# Patient Record
Sex: MALE | Race: WHITE | NOT HISPANIC OR LATINO | Employment: OTHER | URBAN - METROPOLITAN AREA
[De-identification: names, ages, dates, MRNs, and addresses within clinical notes are randomized per-mention and may not be internally consistent; named-entity substitution may affect disease eponyms.]

---

## 2023-06-15 DIAGNOSIS — I10 PRIMARY HYPERTENSION: Primary | ICD-10-CM

## 2023-06-15 RX ORDER — RAMIPRIL 10 MG/1
CAPSULE ORAL
Qty: 90 CAPSULE | Refills: 0 | Status: SHIPPED | OUTPATIENT
Start: 2023-06-15 | End: 2023-09-13

## 2023-07-13 DIAGNOSIS — E78.49 ESSENTIAL FAMILIAL HYPERLIPIDEMIA: ICD-10-CM

## 2023-07-14 RX ORDER — ATORVASTATIN CALCIUM 80 MG/1
TABLET, FILM COATED ORAL
Qty: 90 TABLET | Refills: 0 | Status: SHIPPED | OUTPATIENT
Start: 2023-07-14 | End: 2023-10-23

## 2023-09-13 DIAGNOSIS — I10 PRIMARY HYPERTENSION: ICD-10-CM

## 2023-09-13 RX ORDER — RAMIPRIL 10 MG/1
CAPSULE ORAL
Qty: 90 CAPSULE | Refills: 0 | Status: SHIPPED | OUTPATIENT
Start: 2023-09-13 | End: 2023-12-12

## 2023-10-23 DIAGNOSIS — E78.49 ESSENTIAL FAMILIAL HYPERLIPIDEMIA: ICD-10-CM

## 2023-10-23 RX ORDER — ATORVASTATIN CALCIUM 80 MG/1
TABLET, FILM COATED ORAL
Qty: 90 TABLET | Refills: 0 | Status: SHIPPED | OUTPATIENT
Start: 2023-10-23 | End: 2024-02-05

## 2023-12-05 ENCOUNTER — DOCUMENTATION (OUTPATIENT)
Dept: PRIMARY CARE | Facility: CLINIC | Age: 68
End: 2023-12-05
Payer: MEDICARE

## 2023-12-05 RX ORDER — SITAGLIPTIN AND METFORMIN HYDROCHLORIDE 1000; 50 MG/1; MG/1
1 TABLET, FILM COATED, EXTENDED RELEASE ORAL 2 TIMES DAILY
COMMUNITY
Start: 2022-04-20 | End: 2024-01-04 | Stop reason: SDUPTHER

## 2023-12-05 NOTE — PROGRESS NOTES
Patient brought in a form for his Janumet XR to be covered by the [employer] patient assistance program. We have filled this out in the past for him. Paperwork completed, signed by PCP and mailed to [facility] in the provided self addressed envelope. Copies made for patients chart and a copy has been mailed to patient.

## 2023-12-12 DIAGNOSIS — I10 PRIMARY HYPERTENSION: ICD-10-CM

## 2023-12-12 RX ORDER — RAMIPRIL 10 MG/1
CAPSULE ORAL
Qty: 90 CAPSULE | Refills: 0 | Status: SHIPPED | OUTPATIENT
Start: 2023-12-12 | End: 2024-03-05

## 2023-12-18 ASSESSMENT — ENCOUNTER SYMPTOMS
CHILLS: 0
ACTIVITY CHANGE: 0
COUGH: 0
CONFUSION: 0
APPETITE CHANGE: 0
EYE DISCHARGE: 0
ARTHRALGIAS: 0
FACIAL SWELLING: 0
WHEEZING: 0
PALPITATIONS: 0
COLOR CHANGE: 0
DIARRHEA: 0
FEVER: 0
CONSTIPATION: 0

## 2023-12-18 NOTE — PROGRESS NOTES
"Subjective   Reason for Visit: Kiko Forrest is an 68 y.o. male here for a Medicare Wellness visit.     Past Medical, Surgical, and Family History reviewed and updated in chart.    Reviewed all medications by prescribing practitioner or clinical pharmacist (such as prescriptions, OTCs, herbal therapies and supplements) and documented in the medical record.    HPI     Patient presents for physical exam. Patient is a known diabetic.     Fam Hx  Mom () living, dementia  Dad ()     Exercise walks 5x/week  ETOH every other week  Caffeine 30 oz of coffee/day  Tobacco 1/2 ppd   at [employer], retired     Colonoscopy 2019 Dr. Jung due 2024     Patient denies other complaints.   Patient Self Assessment of Health Status  Patient Self Assessment: Good    Nutrition and Exercise  Current Diet: Well Balanced Diet  Adequate Fluid Intake: Yes  Caffeine: Yes  Exercise Frequency: Infrequently    Functional Ability/Level of Safety  Cognitive Impairment Observed: No cognitive impairment observed  Cognitive Impairment Reported: No cognitive impairment reported by patient or family    Home Safety Risk Factors: None    Patient Care Team:  Jeaneth Siegel DO as PCP - General     Review of Systems   Constitutional:  Negative for activity change, appetite change, chills and fever.   HENT:  Negative for congestion, ear pain and facial swelling.    Eyes:  Negative for discharge.   Respiratory:  Negative for cough and wheezing.    Cardiovascular:  Negative for chest pain, palpitations and leg swelling.   Gastrointestinal:  Negative for constipation and diarrhea.   Musculoskeletal:  Negative for arthralgias.   Skin:  Negative for color change and pallor.   Neurological:  Negative for syncope.   Psychiatric/Behavioral:  Negative for confusion.        Objective   Vitals:  /72 (BP Location: Right arm)   Pulse 60   Ht 1.791 m (5' 10.5\")   Wt 98 kg (216 lb)   BMI 30.55 kg/m²       Physical Exam  Constitutional:       General: He " is not in acute distress.     Appearance: Normal appearance. He is not toxic-appearing.   HENT:      Head: Normocephalic.      Right Ear: Tympanic membrane, ear canal and external ear normal.      Left Ear: Tympanic membrane, ear canal and external ear normal.   Eyes:      Conjunctiva/sclera: Conjunctivae normal.      Pupils: Pupils are equal, round, and reactive to light.   Cardiovascular:      Rate and Rhythm: Normal rate and regular rhythm.      Pulses: Normal pulses.      Heart sounds: Normal heart sounds.   Pulmonary:      Effort: No respiratory distress.      Breath sounds: No wheezing, rhonchi or rales.   Abdominal:      General: Bowel sounds are normal. There is no distension.      Palpations: Abdomen is soft.      Tenderness: There is no abdominal tenderness.   Musculoskeletal:         General: No swelling or tenderness.   Skin:     Findings: No lesion or rash.   Neurological:      General: No focal deficit present.      Mental Status: He is alert and oriented to person, place, and time. Mental status is at baseline.      Gait: Gait normal.   Psychiatric:         Mood and Affect: Mood normal.         Behavior: Behavior normal.         Thought Content: Thought content normal.         Judgment: Judgment normal.         Assessment/Plan   Problem List Items Addressed This Visit       CAD (coronary artery disease)    Relevant Medications    metoprolol tartrate (Lopressor) 25 mg tablet    Mixed hyperlipidemia    Hypertension     Other Visit Diagnoses       Healthcare maintenance    -  Primary    Type 2 diabetes mellitus without complication, without long-term current use of insulin (CMS/[facility])              1. Patient's blood work unavailable at this office visit  2. Patient's LDL goal less than 70 secondary to diabetes, current LDL unavailable  3. Patient's triglycerides unavailable, goal triglycerides less than 150, continue on type IV diet  4. Patient's hgbA1c goal less than 6.5, current hemoglobin A1c  unavailable. Discussion about no added sugar diet and medication, recheck cmp and HgbA1c in 3 month  5. Colonoscopy 2019 Dr. Jung due 2024  6. Patient to call if questions or concerns

## 2023-12-20 ENCOUNTER — OFFICE VISIT (OUTPATIENT)
Dept: PRIMARY CARE | Facility: CLINIC | Age: 68
End: 2023-12-20
Payer: MEDICARE

## 2023-12-20 VITALS
DIASTOLIC BLOOD PRESSURE: 72 MMHG | WEIGHT: 216 LBS | HEIGHT: 71 IN | BODY MASS INDEX: 30.24 KG/M2 | HEART RATE: 60 BPM | SYSTOLIC BLOOD PRESSURE: 118 MMHG

## 2023-12-20 DIAGNOSIS — Z12.5 SCREENING FOR PROSTATE CANCER: ICD-10-CM

## 2023-12-20 DIAGNOSIS — E78.2 MIXED HYPERLIPIDEMIA: ICD-10-CM

## 2023-12-20 DIAGNOSIS — I10 PRIMARY HYPERTENSION: ICD-10-CM

## 2023-12-20 DIAGNOSIS — Z13.89 ENCOUNTER FOR SCREENING FOR OTHER DISORDER: ICD-10-CM

## 2023-12-20 DIAGNOSIS — Z00.00 ROUTINE GENERAL MEDICAL EXAMINATION AT HEALTH CARE FACILITY: ICD-10-CM

## 2023-12-20 DIAGNOSIS — Z71.89 ADVANCE DIRECTIVE DISCUSSED WITH PATIENT: ICD-10-CM

## 2023-12-20 DIAGNOSIS — Z00.00 HEALTHCARE MAINTENANCE: Primary | ICD-10-CM

## 2023-12-20 DIAGNOSIS — I25.10 CORONARY ARTERY DISEASE DUE TO LIPID RICH PLAQUE: ICD-10-CM

## 2023-12-20 DIAGNOSIS — E11.9 TYPE 2 DIABETES MELLITUS WITHOUT COMPLICATION, WITHOUT LONG-TERM CURRENT USE OF INSULIN (MULTI): ICD-10-CM

## 2023-12-20 DIAGNOSIS — I25.83 CORONARY ARTERY DISEASE DUE TO LIPID RICH PLAQUE: ICD-10-CM

## 2023-12-20 PROBLEM — E78.5 HYPERLIPIDEMIA: Status: ACTIVE | Noted: 2023-12-20

## 2023-12-20 PROBLEM — R80.9 PROTEIN IN URINE: Status: ACTIVE | Noted: 2023-12-20

## 2023-12-20 PROBLEM — D41.02: Status: ACTIVE | Noted: 2018-05-02

## 2023-12-20 PROBLEM — N28.89 RENAL MASS: Status: ACTIVE | Noted: 2018-06-18

## 2023-12-20 PROBLEM — D41.22: Status: ACTIVE | Noted: 2018-05-02

## 2023-12-20 PROCEDURE — 99497 ADVNCD CARE PLAN 30 MIN: CPT | Performed by: FAMILY MEDICINE

## 2023-12-20 PROCEDURE — 1158F ADVNC CARE PLAN TLK DOCD: CPT | Performed by: FAMILY MEDICINE

## 2023-12-20 PROCEDURE — G0444 DEPRESSION SCREEN ANNUAL: HCPCS | Performed by: FAMILY MEDICINE

## 2023-12-20 PROCEDURE — 1159F MED LIST DOCD IN RCRD: CPT | Performed by: FAMILY MEDICINE

## 2023-12-20 PROCEDURE — G0439 PPPS, SUBSEQ VISIT: HCPCS | Performed by: FAMILY MEDICINE

## 2023-12-20 PROCEDURE — 3074F SYST BP LT 130 MM HG: CPT | Performed by: FAMILY MEDICINE

## 2023-12-20 PROCEDURE — 4010F ACE/ARB THERAPY RXD/TAKEN: CPT | Performed by: FAMILY MEDICINE

## 2023-12-20 PROCEDURE — 99214 OFFICE O/P EST MOD 30 MIN: CPT | Performed by: FAMILY MEDICINE

## 2023-12-20 PROCEDURE — 3078F DIAST BP <80 MM HG: CPT | Performed by: FAMILY MEDICINE

## 2023-12-20 PROCEDURE — 1170F FXNL STATUS ASSESSED: CPT | Performed by: FAMILY MEDICINE

## 2023-12-20 PROCEDURE — 1160F RVW MEDS BY RX/DR IN RCRD: CPT | Performed by: FAMILY MEDICINE

## 2023-12-20 RX ORDER — METOPROLOL TARTRATE 25 MG/1
1 TABLET, FILM COATED ORAL 2 TIMES DAILY
COMMUNITY
Start: 2013-04-15 | End: 2024-02-19

## 2023-12-20 RX ORDER — ASPIRIN 81 MG/1
81 TABLET ORAL 2 TIMES DAILY
COMMUNITY

## 2023-12-20 RX ORDER — CHOLECALCIFEROL (VITAMIN D3) 25 MCG
TABLET,CHEWABLE ORAL
COMMUNITY

## 2023-12-20 ASSESSMENT — ACTIVITIES OF DAILY LIVING (ADL)
GROCERY_SHOPPING: INDEPENDENT
DRESSING: INDEPENDENT
TAKING_MEDICATION: INDEPENDENT
BATHING: INDEPENDENT
DOING_HOUSEWORK: INDEPENDENT
MANAGING_FINANCES: INDEPENDENT

## 2023-12-20 ASSESSMENT — PATIENT HEALTH QUESTIONNAIRE - PHQ9
1. LITTLE INTEREST OR PLEASURE IN DOING THINGS: NOT AT ALL
SUM OF ALL RESPONSES TO PHQ9 QUESTIONS 1 AND 2: 0
2. FEELING DOWN, DEPRESSED OR HOPELESS: NOT AT ALL

## 2023-12-20 ASSESSMENT — ENCOUNTER SYMPTOMS
LOSS OF SENSATION IN FEET: 0
OCCASIONAL FEELINGS OF UNSTEADINESS: 0
DEPRESSION: 0

## 2023-12-20 NOTE — ACP (ADVANCE CARE PLANNING)
Confirming Previous Code Status:   Advance Care Planning Note     Discussion Date: 12/20/23   Discussion Participants: patient    The patient wishes to discuss Advance Care Planning today and the following is a brief summary of our discussion.     Patient has capacity to make their own medical decisions: Yes  Health Care Agent/Surrogate Decision Maker documented in chart: Yes  wife  Documents on file and valid:  Advance Directive/Living Will: Yes   Health Care Power of : Yes  Other:     Communication of Medical Status/Prognosis:   stable    Communication of Treatment Goals/Options:   stable    Treatment Decisions  Goals of Care: quality of life is prioritized; willing to accept low-burden treatments to achieve meaningful goals     Follow Up Plan  stable  Team Members  stable  Time Statement: Total face to face time spent on advance care planning was 5 minutes with 16 minutes spent in counseling, including the explanation.    Jeaneth Siegel DO  12/20/2023 8:32 AM

## 2024-01-04 ENCOUNTER — TELEPHONE (OUTPATIENT)
Dept: PRIMARY CARE | Facility: CLINIC | Age: 69
End: 2024-01-04
Payer: MEDICARE

## 2024-01-04 DIAGNOSIS — E11.9 TYPE 2 DIABETES MELLITUS WITHOUT COMPLICATION, WITHOUT LONG-TERM CURRENT USE OF INSULIN (MULTI): Primary | ICD-10-CM

## 2024-01-04 RX ORDER — SITAGLIPTIN AND METFORMIN HYDROCHLORIDE 1000; 50 MG/1; MG/1
1 TABLET, FILM COATED, EXTENDED RELEASE ORAL 2 TIMES DAILY
Qty: 56 TABLET | Refills: 0 | COMMUNITY
Start: 2024-01-04 | End: 2024-02-21 | Stop reason: SDUPTHER

## 2024-01-04 NOTE — PROGRESS NOTES
Call received from patient regarding his Jan[facility]et XR [facility]. He called [facility] and they state they did not receive the form for re-enrollment. I have a copy in his chart and my documentation shows this was mailed to them on 12/5/23. I called [facility] to see if I could fax it them, but they do not allow that. I do need to mail them a copy of the form. Copy of form printed and mailed to the address on the paperwork (I confirmed with [facility] that this is the right address). Patient however is going to be out of his medication in about one week. I found samples that he could have that are good until the end of the month. He will swing by to pick them up in the next day or so.

## 2024-02-03 DIAGNOSIS — E78.49 ESSENTIAL FAMILIAL HYPERLIPIDEMIA: ICD-10-CM

## 2024-02-05 RX ORDER — ATORVASTATIN CALCIUM 80 MG/1
TABLET, FILM COATED ORAL
Qty: 90 TABLET | Refills: 0 | Status: SHIPPED | OUTPATIENT
Start: 2024-02-05 | End: 2024-05-02 | Stop reason: SDUPTHER

## 2024-02-14 ENCOUNTER — PATIENT OUTREACH (OUTPATIENT)
Dept: PRIMARY CARE | Facility: CLINIC | Age: 69
End: 2024-02-14
Payer: MEDICARE

## 2024-02-14 DIAGNOSIS — E11.9 TYPE 2 DIABETES MELLITUS WITHOUT COMPLICATION, WITHOUT LONG-TERM CURRENT USE OF INSULIN (MULTI): ICD-10-CM

## 2024-02-14 NOTE — PROGRESS NOTES
Patient called me to let me know that he is running low on Janumet XR. He has been getting the run around from [employer] about them receiving the application for assistance. They did tell patient they received 2 applications. They said they mailed out the attestation form to him on 2/6 yet he states he received it on 2/6. He has completed this and sent this back. I did check the office and we do not have samples of the medication here for me to offer. He states that he feels like he will be ok without it. I did contact [employer] to inquire about samples. Everything has to be completed online now, so I created an account and requested the necessary samples. They say it takes 3-5 days to be sent to our office. Once received, I will call Keven and let him know he can pick some up. He also agreed to let me know in the meantime if he receives his medication from [employer].

## 2024-02-18 DIAGNOSIS — I10 PRIMARY HYPERTENSION: Primary | ICD-10-CM

## 2024-02-19 RX ORDER — METOPROLOL TARTRATE 25 MG/1
25 TABLET, FILM COATED ORAL 2 TIMES DAILY
Qty: 180 TABLET | Refills: 0 | Status: SHIPPED | OUTPATIENT
Start: 2024-02-19 | End: 2024-05-21

## 2024-02-21 ENCOUNTER — PATIENT OUTREACH (OUTPATIENT)
Dept: PRIMARY CARE | Facility: CLINIC | Age: 69
End: 2024-02-21
Payer: MEDICARE

## 2024-02-21 DIAGNOSIS — E11.9 TYPE 2 DIABETES MELLITUS WITHOUT COMPLICATION, WITHOUT LONG-TERM CURRENT USE OF INSULIN (MULTI): ICD-10-CM

## 2024-02-21 RX ORDER — SITAGLIPTIN AND METFORMIN HYDROCHLORIDE 1000; 50 MG/1; MG/1
1 TABLET, FILM COATED, EXTENDED RELEASE ORAL 2 TIMES DAILY
Qty: 56 TABLET | Refills: 0 | COMMUNITY
Start: 2024-02-21

## 2024-02-21 NOTE — PROGRESS NOTES
Patient notified that samples have been received in the office. He can pick them up at any time. He states he did talk to [employer] yesterday. He keeps getting the run around from them. They sent out another attestation form to go with the second application they got. But they state they never got the first one back either. So he is out of medication. #56 tabs available for  of Janumet XR  mg. Lot # [id number] Exp 4/25.

## 2024-03-04 ENCOUNTER — TELEPHONE (OUTPATIENT)
Dept: PRIMARY CARE | Facility: CLINIC | Age: 69
End: 2024-03-04
Payer: MEDICARE

## 2024-03-04 NOTE — TELEPHONE ENCOUNTER
Please schedule patient for [facility] wellness visit in Dec 2024. Schedule with Dr. Siegel Please send this encounter to Dr. Siegel for lab orders once scheduled.     Thank you-  Luke Wild CMA  3/4/2024  Practice Supervisor  [facility]

## 2024-03-05 DIAGNOSIS — I10 PRIMARY HYPERTENSION: ICD-10-CM

## 2024-03-05 RX ORDER — RAMIPRIL 10 MG/1
CAPSULE ORAL
Qty: 90 CAPSULE | Refills: 0 | Status: SHIPPED | OUTPATIENT
Start: 2024-03-05 | End: 2024-05-30 | Stop reason: SDUPTHER

## 2024-03-11 NOTE — TELEPHONE ENCOUNTER
Pt coming in on 12/03/24 for medicare annual physical. BW? Pt would like blood work orders mailed to:  [address]  [facility] [id number]

## 2024-03-12 DIAGNOSIS — I25.10 CORONARY ARTERY DISEASE DUE TO LIPID RICH PLAQUE: ICD-10-CM

## 2024-03-12 DIAGNOSIS — Z12.5 SCREENING FOR PROSTATE CANCER: ICD-10-CM

## 2024-03-12 DIAGNOSIS — I25.83 CORONARY ARTERY DISEASE DUE TO LIPID RICH PLAQUE: ICD-10-CM

## 2024-03-12 DIAGNOSIS — I10 PRIMARY HYPERTENSION: ICD-10-CM

## 2024-03-12 DIAGNOSIS — Z00.00 ENCOUNTER FOR ANNUAL GENERAL MEDICAL EXAMINATION WITHOUT ABNORMAL FINDINGS IN ADULT: ICD-10-CM

## 2024-03-12 DIAGNOSIS — E11.9 TYPE 2 DIABETES MELLITUS WITHOUT COMPLICATION, WITHOUT LONG-TERM CURRENT USE OF INSULIN (MULTI): ICD-10-CM

## 2024-03-12 DIAGNOSIS — E78.2 MIXED HYPERLIPIDEMIA: ICD-10-CM

## 2024-05-02 DIAGNOSIS — E78.49 ESSENTIAL FAMILIAL HYPERLIPIDEMIA: ICD-10-CM

## 2024-05-02 RX ORDER — ATORVASTATIN CALCIUM 80 MG/1
TABLET, FILM COATED ORAL
Qty: 90 TABLET | Refills: 0 | Status: SHIPPED | OUTPATIENT
Start: 2024-05-02

## 2024-05-21 DIAGNOSIS — I10 PRIMARY HYPERTENSION: ICD-10-CM

## 2024-05-21 RX ORDER — METOPROLOL TARTRATE 25 MG/1
25 TABLET, FILM COATED ORAL 2 TIMES DAILY
Qty: 180 TABLET | Refills: 0 | Status: SHIPPED | OUTPATIENT
Start: 2024-05-21

## 2024-05-30 DIAGNOSIS — I10 PRIMARY HYPERTENSION: ICD-10-CM

## 2024-05-30 RX ORDER — RAMIPRIL 10 MG/1
CAPSULE ORAL
Qty: 90 CAPSULE | Refills: 0 | Status: SHIPPED | OUTPATIENT
Start: 2024-05-30

## 2024-07-05 DIAGNOSIS — I10 PRIMARY HYPERTENSION: ICD-10-CM

## 2024-07-05 RX ORDER — METOPROLOL TARTRATE 25 MG/1
25 TABLET, FILM COATED ORAL 2 TIMES DAILY
Qty: 180 TABLET | Refills: 0 | Status: SHIPPED | OUTPATIENT
Start: 2024-07-05

## 2024-07-29 DIAGNOSIS — E78.49 ESSENTIAL FAMILIAL HYPERLIPIDEMIA: ICD-10-CM

## 2024-07-29 DIAGNOSIS — I10 PRIMARY HYPERTENSION: ICD-10-CM

## 2024-07-29 RX ORDER — ATORVASTATIN CALCIUM 80 MG/1
TABLET, FILM COATED ORAL
Qty: 90 TABLET | Refills: 0 | Status: SHIPPED | OUTPATIENT
Start: 2024-07-29

## 2024-07-29 RX ORDER — RAMIPRIL 10 MG/1
CAPSULE ORAL
Qty: 90 CAPSULE | Refills: 0 | Status: SHIPPED | OUTPATIENT
Start: 2024-07-29

## 2024-07-29 RX ORDER — METOPROLOL TARTRATE 25 MG/1
25 TABLET, FILM COATED ORAL 2 TIMES DAILY
Qty: 180 TABLET | Refills: 0 | Status: SHIPPED | OUTPATIENT
Start: 2024-07-29

## 2024-09-14 ENCOUNTER — OFFICE VISIT (OUTPATIENT)
Dept: PRIMARY CARE | Facility: CLINIC | Age: 69
End: 2024-09-14
Payer: COMMERCIAL

## 2024-09-14 VITALS
TEMPERATURE: 97.8 F | SYSTOLIC BLOOD PRESSURE: 122 MMHG | WEIGHT: 209 LBS | BODY MASS INDEX: 29.56 KG/M2 | DIASTOLIC BLOOD PRESSURE: 78 MMHG | HEART RATE: 61 BPM

## 2024-09-14 DIAGNOSIS — Z00.00 HEALTHCARE MAINTENANCE: ICD-10-CM

## 2024-09-14 DIAGNOSIS — E11.9 TYPE 2 DIABETES MELLITUS WITHOUT COMPLICATION, WITHOUT LONG-TERM CURRENT USE OF INSULIN (MULTI): ICD-10-CM

## 2024-09-14 DIAGNOSIS — L89.893: Primary | ICD-10-CM

## 2024-09-14 LAB — POC HEMOGLOBIN A1C: 6.6 % (ref 4.2–6.5)

## 2024-09-14 PROCEDURE — 3078F DIAST BP <80 MM HG: CPT | Performed by: FAMILY MEDICINE

## 2024-09-14 PROCEDURE — 1159F MED LIST DOCD IN RCRD: CPT | Performed by: FAMILY MEDICINE

## 2024-09-14 PROCEDURE — 99214 OFFICE O/P EST MOD 30 MIN: CPT | Performed by: FAMILY MEDICINE

## 2024-09-14 PROCEDURE — 83036 HEMOGLOBIN GLYCOSYLATED A1C: CPT | Performed by: FAMILY MEDICINE

## 2024-09-14 PROCEDURE — 3074F SYST BP LT 130 MM HG: CPT | Performed by: FAMILY MEDICINE

## 2024-09-14 PROCEDURE — 1160F RVW MEDS BY RX/DR IN RCRD: CPT | Performed by: FAMILY MEDICINE

## 2024-09-14 PROCEDURE — 4010F ACE/ARB THERAPY RXD/TAKEN: CPT | Performed by: FAMILY MEDICINE

## 2024-09-14 RX ORDER — ACYCLOVIR 200 MG/1
200 CAPSULE ORAL
Qty: 50 CAPSULE | Refills: 0 | Status: SHIPPED | OUTPATIENT
Start: 2024-09-14 | End: 2024-09-24

## 2024-09-14 RX ORDER — DOXYCYCLINE 100 MG/1
100 CAPSULE ORAL 2 TIMES DAILY
Qty: 20 CAPSULE | Refills: 0 | Status: SHIPPED | OUTPATIENT
Start: 2024-09-14 | End: 2024-09-24

## 2024-09-14 ASSESSMENT — ENCOUNTER SYMPTOMS
FEVER: 0
CONFUSION: 0
WHEEZING: 0
APPETITE CHANGE: 0
FACIAL SWELLING: 0
COLOR CHANGE: 0
EYE DISCHARGE: 0
PALPITATIONS: 0
WOUND: 1
ARTHRALGIAS: 0
DIARRHEA: 0
CONSTIPATION: 0
CHILLS: 0
ACTIVITY CHANGE: 0
COUGH: 0

## 2024-09-14 NOTE — PROGRESS NOTES
"Subjective   Patient ID: Kiko Forrest \"Robson" is a 69 y.o. male who presents for Possible infected wound right lower leg. .    HPI   Patient has had a wound on his anterior shin for about 4-6 weeks. A few weeks ago he then bumped into the back of something. He now has two large ulcerations on his right lower leg.     Review of Systems   Constitutional:  Negative for activity change, appetite change, chills and fever.   HENT:  Negative for congestion, ear pain and facial swelling.    Eyes:  Negative for discharge.   Respiratory:  Negative for cough and wheezing.    Cardiovascular:  Negative for chest pain, palpitations and leg swelling.   Gastrointestinal:  Negative for constipation and diarrhea.   Musculoskeletal:  Negative for arthralgias.   Skin:  Positive for wound. Negative for color change and pallor.   Neurological:  Negative for syncope.   Psychiatric/Behavioral:  Negative for confusion.        Objective   /78 (BP Location: Left arm, Patient Position: Sitting)   Pulse 61   Temp 36.6 °C (97.8 °F)   Wt 94.8 kg (209 lb)   BMI 29.56 kg/m²   BSA Body surface area is 2.17 meters squared.      Physical Exam  Constitutional:       General: He is not in acute distress.     Appearance: Normal appearance. He is not toxic-appearing.   HENT:      Head: Normocephalic.      Right Ear: Tympanic membrane, ear canal and external ear normal.      Left Ear: Tympanic membrane, ear canal and external ear normal.   Eyes:      Conjunctiva/sclera: Conjunctivae normal.      Pupils: Pupils are equal, round, and reactive to light.   Cardiovascular:      Rate and Rhythm: Normal rate and regular rhythm.      Pulses: Normal pulses.      Heart sounds: Normal heart sounds.   Pulmonary:      Effort: No respiratory distress.      Breath sounds: No wheezing, rhonchi or rales.   Musculoskeletal:         General: No swelling or tenderness.   Skin:     Findings: Lesion present. No rash.      Comments: Anterior right shin 2 cm x 2 cm " ulceration  Posterior right shin 2.5 cm x 2.5 cm ulceration   Neurological:      General: No focal deficit present.      Mental Status: He is alert and oriented to person, place, and time. Mental status is at baseline.      Gait: Gait normal.   Psychiatric:         Mood and Affect: Mood normal.         Behavior: Behavior normal.         Thought Content: Thought content normal.         Judgment: Judgment normal.     No visits with results within 1 Year(s) from this visit.   Latest known visit with results is:   Legacy Encounter on 04/21/2022   Component Date Value Ref Range Status   • Prostate Specific Antigen,Screen 04/21/2022 2.83  0.00 - 4.00 ng/mL Final    Comment: The FDA requires that the method used for PSA assay be   reported to the physician. Values obtained with different   assay methods must not be used interchangeably. This test   was performed at [facility] using the Siemens  to-BBB PSA method, which is a sandwich immunoassay using   chemiluminescence for quantitation. The assay is approved  for measurement of prostate-specific antigen (PSA) in   serum and may be used in conjunction with a digital rectal  examination in men 50 years and older as an aid in   detection of prostate cancer.   5-Alpha-reductase inhibitors (e.g. Proscar, Finasteride,   Avodart, Dutasteride and Kay) for the treatment of BPH   have been shown to lower PSA levels by an average of 50%   after 6 months of treatment.     • Hemoglobin A1C 04/21/2022 7.2 (A)  % Final    Comment:      Diagnosis of Diabetes-Adults   Non-Diabetic: < or = 5.6%   Increased risk for developing diabetes: 5.7-6.4%   Diagnostic of diabetes: > or = 6.5%  .       Monitoring of Diabetes                Age (y)     Therapeutic Goal (%)   Adults:          >18           <7.0   Pediatrics:    13-18           <7.5                   7-12           <8.0                   0- 6            7.5-8.5   American Diabetes Association. Diabetes Care 33(S1),  Jan 2010.     • Estimated Average Glucose 04/21/2022 160  MG/DL Final   • TSH 04/21/2022 2.01  0.44 - 3.98 mIU/L Final    Comment:  TSH testing is performed using different testing    methodology at [facility] than at other    [facility] hospitals. Direct result comparisons should    only be made within the same method.     • Magnesium 04/21/2022 1.83  1.60 - 2.40 mg/dL Final   • Vitamin B-12 04/21/2022 339  211 - 911 pg/mL Final   • Glucose 04/21/2022 147 (H)  74 - 99 mg/dL Final   • Sodium 04/21/2022 135 (L)  136 - 145 mmol/L Final   • Potassium 04/21/2022 4.8  3.5 - 5.3 mmol/L Final   • Chloride 04/21/2022 101  98 - 107 mmol/L Final   • Bicarbonate 04/21/2022 26  21 - 32 mmol/L Final   • Anion Gap 04/21/2022 13  10 - 20 mmol/L Final   • Urea Nitrogen 04/21/2022 24 (H)  6 - 23 mg/dL Final   • Creatinine 04/21/2022 1.43 (H)  0.50 - 1.30 mg/dL Final   • GFR MALE 04/21/2022 54 (A)  >90 mL/min/1.73m2 Final    Comment:  CALCULATIONS OF ESTIMATED GFR ARE PERFORMED   USING THE 2021 CKD-EPI STUDY REFIT EQUATION   WITHOUT THE RACE VARIABLE FOR THE IDMS-TRACEABLE   CREATININE METHODS.    https://jasn.asnjournals.org/content/early/2021/09/22/ASN.9250114470     • Calcium 04/21/2022 10.2  8.6 - 10.6 mg/dL Final   • Albumin 04/21/2022 4.6  3.4 - 5.0 g/dL Final   • Alkaline Phosphatase 04/21/2022 124  33 - 136 U/L Final   • Total Protein 04/21/2022 6.9  6.4 - 8.2 g/dL Final   • AST 04/21/2022 17  9 - 39 U/L Final   • Total Bilirubin 04/21/2022 1.0  0.0 - 1.2 mg/dL Final   • ALT (SGPT) 04/21/2022 23  10 - 52 U/L Final    Comment:  Patients treated with Sulfasalazine may generate    falsely decreased results for ALT.     • WBC 04/21/2022 10.7  4.4 - 11.3 x10E9/L Final   • nRBC 04/21/2022 0.0  0.0 - 0.0 /100 WBC Final   • RBC 04/21/2022 5.62  4.50 - 5.90 x10E12/L Final   • Hemoglobin 04/21/2022 18.3 (H)  13.5 - 17.5 g/dL Final   • Hematocrit 04/21/2022 54.4 (H)  41.0 - 52.0 % Final   • MCV 04/21/2022 97  80 - 100 fL Final    • MCHC 04/21/2022 33.6  32.0 - 36.0 g/dL Final   • Platelets 04/21/2022 187  150 - 450 x10E9/L Final   • RDW 04/21/2022 12.4  11.5 - 14.5 % Final   • Neutrophils % 04/21/2022 69.6  40.0 - 80.0 % Final   • Immature Granulocytes %, Automated 04/21/2022 0.5  0.0 - 0.9 % Final    Comment:  Immature Granulocyte Count (IG) includes promyelocytes,    myelocytes and metamyelocytes but does not include bands.   Percent differential counts (%) should be interpreted in the   context of the absolute cell counts (cells/L).     • Lymphocytes % 04/21/2022 18.7  13.0 - 44.0 % Final   • Monocytes % 04/21/2022 7.3  2.0 - 10.0 % Final   • Eosinophils % 04/21/2022 3.0  0.0 - 6.0 % Final   • Basophils % 04/21/2022 0.9  0.0 - 2.0 % Final   • Neutrophils Absolute 04/21/2022 7.46  1.20 - 7.70 x10E9/L Final   • Lymphocytes Absolute 04/21/2022 2.00  1.20 - 4.80 x10E9/L Final   • Monocytes Absolute 04/21/2022 0.78  0.10 - 1.00 x10E9/L Final   • Eosinophils Absolute 04/21/2022 0.32  0.00 - 0.70 x10E9/L Final   • Basophils Absolute 04/21/2022 0.10  0.00 - 0.10 x10E9/L Final   • Cholesterol 04/21/2022 111  0 - 199 mg/dL Final    Comment: .      AGE      DESIRABLE   BORDERLINE HIGH   HIGH     0-19 Y     0 - 169       170 - 199     >/= 200    20-24 Y     0 - 189       190 - 224     >/= 225         >24 Y     0 - 199       200 - 239     >/= 240   **All ranges are based on fasting samples. Specific   therapeutic targets will vary based on patient-specific   cardiac risk.  .   Pediatric guidelines reference:Pediatrics 2011, 128(S5).   Adult guidelines reference: NCEP ATPIII Guidelines,     ML 2001, 258:2486-97  .   Venipuncture immediately after or during the    administration of Metamizole may lead to falsely   low results. Testing should be performed immediately   prior to Metamizole dosing.     • HDL 04/21/2022 25.8 (A)  mg/dL Final    Comment: .      AGE      VERY LOW   LOW     NORMAL    HIGH       0-19 Y       < 35   < 40     40-45     ----     20-24 Y       ----   < 40       >45     ----      >24 Y       ----   < 40     40-60      >60  .     • Cholesterol/HDL Ratio 04/21/2022 4.3   Final    Comment: REF VALUES  DESIRABLE  < 3.4  HIGH RISK  > 5.0     • LDL 04/21/2022 49  0 - 99 mg/dL Final    Comment: .                           NEAR      BORD      AGE      DESIRABLE  OPTIMAL    HIGH     HIGH     VERY HIGH     0-19 Y     0 - 109     ---    110-129   >/= 130     ----    20-24 Y     0 - 119     ---    120-159   >/= 160     ----      >24 Y     0 -  99   100-129  130-159   160-189     >/=190  .     • VLDL 04/21/2022 36  0 - 40 mg/dL Final   • Triglycerides 04/21/2022 180 (H)  0 - 149 mg/dL Final    Comment: .      AGE      DESIRABLE   BORDERLINE HIGH   HIGH     VERY HIGH   0 D-90 D    19 - 174         ----         ----        ----  91 D- 9 Y     0 -  74        75 -  99     >/= 100      ----    10-19 Y     0 -  89        90 - 129     >/= 130      ----    20-24 Y     0 - 114       115 - 149     >/= 150      ----         >24 Y     0 - 149       150 - 199    200- 499    >/= 500  .   Venipuncture immediately after or during the    administration of Metamizole may lead to falsely   low results. Testing should be performed immediately   prior to Metamizole dosing.       Current Outpatient Medications on File Prior to Visit   Medication Sig Dispense Refill   • aspirin 81 mg EC tablet Take 1 tablet (81 mg) by mouth 2 times a day.     • atorvastatin (Lipitor) 80 mg tablet TAKE 1 TABLET BY MOUTH ONCE DAILY 90 tablet 0   • cyanocobalamin, vitamin B-12, 3,000 mcg lozenge Place under the tongue once daily.     • metoprolol tartrate (Lopressor) 25 mg tablet TAKE 1 TABLET BY MOUTH TWO TIMES A  tablet 0   • ramipril (Altace) 10 mg capsule TAKE 1 CAPSULE BY MOUTH ONCE DAILY 90 capsule 0   • SITagliptin phos-metformin (Janumet XR) 50-1,000 mg tablet, ER multiphase 24 hr Take 1 tablet by mouth 2 times a day. 56 tablet 0     No current facility-administered medications on  file prior to visit.     No images are attached to the encounter.            Assessment/Plan   Diagnoses and all orders for this visit:  Pressure injury of lower extremity, stage 3 (Multi)  -     doxycycline (Vibramycin) 100 mg capsule; Take 1 capsule (100 mg) by mouth 2 times a day for 10 days. Take with at least 8 ounces (large glass) of water, do not lie down for 30 minutes after  -     Vascular US ankle brachial index (CARMEN) without exercise; Future  -     NM bone whole body; Future  Patient to have ankle-brachial index and bone scan  Patient to be seen by wound center  Patient will follow-up with us after his vacation  Patient to call if worsening symptoms

## 2024-10-02 ENCOUNTER — HOSPITAL ENCOUNTER (OUTPATIENT)
Dept: VASCULAR MEDICINE | Facility: CLINIC | Age: 69
Discharge: HOME | End: 2024-10-02
Payer: MEDICARE

## 2024-10-02 ENCOUNTER — HOSPITAL ENCOUNTER (OUTPATIENT)
Dept: RADIOLOGY | Facility: CLINIC | Age: 69
Discharge: HOME | End: 2024-10-02
Payer: MEDICARE

## 2024-10-02 DIAGNOSIS — E13.621 OTHER SPECIFIED DIABETES MELLITUS WITH FOOT ULCER (CODE): ICD-10-CM

## 2024-10-02 DIAGNOSIS — L89.893: ICD-10-CM

## 2024-10-02 PROCEDURE — 78315 BONE IMAGING 3 PHASE: CPT

## 2024-10-02 PROCEDURE — 93922 UPR/L XTREMITY ART 2 LEVELS: CPT | Performed by: INTERNAL MEDICINE

## 2024-10-02 PROCEDURE — 3430000001 HC RX 343 DIAGNOSTIC RADIOPHARMACEUTICALS: Performed by: FAMILY MEDICINE

## 2024-10-02 PROCEDURE — A9503 TC99M MEDRONATE: HCPCS | Performed by: FAMILY MEDICINE

## 2024-10-02 PROCEDURE — 78315 BONE IMAGING 3 PHASE: CPT | Performed by: RADIOLOGY

## 2024-10-02 PROCEDURE — 93922 UPR/L XTREMITY ART 2 LEVELS: CPT

## 2024-11-04 ENCOUNTER — APPOINTMENT (OUTPATIENT)
Dept: PRIMARY CARE | Facility: CLINIC | Age: 69
End: 2024-11-04
Payer: MEDICARE

## 2024-11-04 VITALS — WEIGHT: 200.8 LBS | DIASTOLIC BLOOD PRESSURE: 62 MMHG | BODY MASS INDEX: 28.4 KG/M2 | SYSTOLIC BLOOD PRESSURE: 120 MMHG

## 2024-11-04 DIAGNOSIS — E11.9 TYPE 2 DIABETES MELLITUS WITHOUT COMPLICATION, WITHOUT LONG-TERM CURRENT USE OF INSULIN (MULTI): Primary | ICD-10-CM

## 2024-11-04 DIAGNOSIS — I10 PRIMARY HYPERTENSION: ICD-10-CM

## 2024-11-04 DIAGNOSIS — E78.2 MIXED HYPERLIPIDEMIA: ICD-10-CM

## 2024-11-04 DIAGNOSIS — L89.893: ICD-10-CM

## 2024-11-04 PROCEDURE — 3074F SYST BP LT 130 MM HG: CPT | Performed by: FAMILY MEDICINE

## 2024-11-04 PROCEDURE — G2211 COMPLEX E/M VISIT ADD ON: HCPCS | Performed by: FAMILY MEDICINE

## 2024-11-04 PROCEDURE — 99214 OFFICE O/P EST MOD 30 MIN: CPT | Performed by: FAMILY MEDICINE

## 2024-11-04 PROCEDURE — 1160F RVW MEDS BY RX/DR IN RCRD: CPT | Performed by: FAMILY MEDICINE

## 2024-11-04 PROCEDURE — 1159F MED LIST DOCD IN RCRD: CPT | Performed by: FAMILY MEDICINE

## 2024-11-04 PROCEDURE — 4010F ACE/ARB THERAPY RXD/TAKEN: CPT | Performed by: FAMILY MEDICINE

## 2024-11-04 PROCEDURE — 3078F DIAST BP <80 MM HG: CPT | Performed by: FAMILY MEDICINE

## 2024-11-04 ASSESSMENT — ENCOUNTER SYMPTOMS
FACIAL SWELLING: 0
CONFUSION: 0
PALPITATIONS: 0
WHEEZING: 0
COUGH: 0
APPETITE CHANGE: 0
WOUND: 1
COLOR CHANGE: 0
CHILLS: 0
EYE DISCHARGE: 0
DIARRHEA: 0
ARTHRALGIAS: 0
ACTIVITY CHANGE: 0
FEVER: 0
CONSTIPATION: 0

## 2024-11-04 NOTE — PROGRESS NOTES
"Subjective   Patient ID: Kiko Forrest \"Robson" is a 69 y.o. male who presents for No chief complaint on file..    HPI   Patient has had a wound on his anterior shin for about 3 months. 3 months ago he bumped into the back of something. He now has two large ulcerations on his right lower leg.  He did have ankle-brachial index which was negative.  Also patient's bone scan was negative for osteomyelitis.    Review of Systems   Constitutional:  Negative for activity change, appetite change, chills and fever.   HENT:  Negative for congestion, ear pain and facial swelling.    Eyes:  Negative for discharge.   Respiratory:  Negative for cough and wheezing.    Cardiovascular:  Negative for chest pain, palpitations and leg swelling.   Gastrointestinal:  Negative for constipation and diarrhea.   Musculoskeletal:  Negative for arthralgias.   Skin:  Positive for wound. Negative for color change and pallor.   Neurological:  Negative for syncope.   Psychiatric/Behavioral:  Negative for confusion.        Objective   There were no vitals taken for this visit.  BSA There is no height or weight on file to calculate BSA.      Physical Exam  Constitutional:       General: He is not in acute distress.     Appearance: Normal appearance. He is not toxic-appearing.   HENT:      Head: Normocephalic.      Right Ear: Tympanic membrane, ear canal and external ear normal.      Left Ear: Tympanic membrane, ear canal and external ear normal.   Eyes:      Conjunctiva/sclera: Conjunctivae normal.      Pupils: Pupils are equal, round, and reactive to light.   Cardiovascular:      Rate and Rhythm: Normal rate and regular rhythm.      Pulses: Normal pulses.      Heart sounds: Normal heart sounds.   Pulmonary:      Effort: No respiratory distress.      Breath sounds: No wheezing, rhonchi or rales.   Musculoskeletal:         General: No swelling or tenderness.   Skin:     Findings: Lesion present. No rash.      Comments: Anterior right shin 2 cm x 2 cm " ulceration  Posterior right shin 2.5 cm x 2.5 cm ulceration   Neurological:      General: No focal deficit present.      Mental Status: He is alert and oriented to person, place, and time. Mental status is at baseline.      Gait: Gait normal.   Psychiatric:         Mood and Affect: Mood normal.         Behavior: Behavior normal.         Thought Content: Thought content normal.         Judgment: Judgment normal.       Office Visit on 09/14/2024   Component Date Value Ref Range Status    POC HEMOGLOBIN A1c 09/14/2024 6.6 (A)  4.2 - 6.5 % Final     Current Outpatient Medications on File Prior to Visit   Medication Sig Dispense Refill    aspirin 81 mg EC tablet Take 1 tablet (81 mg) by mouth 2 times a day.      atorvastatin (Lipitor) 80 mg tablet TAKE 1 TABLET BY MOUTH ONCE DAILY 90 tablet 0    cyanocobalamin, vitamin B-12, 3,000 mcg lozenge Place under the tongue once daily.      metoprolol tartrate (Lopressor) 25 mg tablet TAKE 1 TABLET BY MOUTH TWO TIMES A  tablet 0    ramipril (Altace) 10 mg capsule TAKE 1 CAPSULE BY MOUTH ONCE DAILY 90 capsule 0    SITagliptin phos-metformin (Janumet XR) 50-1,000 mg tablet, ER multiphase 24 hr Take 1 tablet by mouth 2 times a day. 56 tablet 0     No current facility-administered medications on file prior to visit.     No images are attached to the encounter.            Assessment/Plan   There are no diagnoses linked to this encounter.    Patient to continue to keep keep his blood pressures under good control  We will follow-up with us regarding leg if wounds worsen  Patient to call if worsening symptoms

## 2024-11-10 DIAGNOSIS — E78.49 ESSENTIAL FAMILIAL HYPERLIPIDEMIA: ICD-10-CM

## 2024-11-11 RX ORDER — ATORVASTATIN CALCIUM 80 MG/1
TABLET, FILM COATED ORAL
Qty: 90 TABLET | Refills: 0 | Status: SHIPPED | OUTPATIENT
Start: 2024-11-11

## 2024-11-13 DIAGNOSIS — I10 PRIMARY HYPERTENSION: ICD-10-CM

## 2024-11-13 RX ORDER — RAMIPRIL 10 MG/1
CAPSULE ORAL
Qty: 90 CAPSULE | Refills: 0 | Status: SHIPPED | OUTPATIENT
Start: 2024-11-13

## 2024-11-18 DIAGNOSIS — E11.9 TYPE 2 DIABETES MELLITUS WITHOUT COMPLICATION, WITHOUT LONG-TERM CURRENT USE OF INSULIN (MULTI): ICD-10-CM

## 2024-11-18 RX ORDER — SITAGLIPTIN AND METFORMIN HYDROCHLORIDE 1000; 50 MG/1; MG/1
1 TABLET, FILM COATED, EXTENDED RELEASE ORAL 2 TIMES DAILY
Qty: 180 TABLET | Refills: 2 | Status: SHIPPED | OUTPATIENT
Start: 2024-11-18

## 2024-12-03 ENCOUNTER — APPOINTMENT (OUTPATIENT)
Dept: PRIMARY CARE | Facility: CLINIC | Age: 69
End: 2024-12-03
Payer: MEDICARE

## 2024-12-23 ENCOUNTER — PATIENT OUTREACH (OUTPATIENT)
Dept: PRIMARY CARE | Facility: CLINIC | Age: 69
End: 2024-12-23
Payer: MEDICARE

## 2024-12-23 DIAGNOSIS — L89.893: ICD-10-CM

## 2024-12-23 DIAGNOSIS — I25.10 CORONARY ARTERY DISEASE DUE TO LIPID RICH PLAQUE: ICD-10-CM

## 2024-12-23 DIAGNOSIS — E11.9 TYPE 2 DIABETES MELLITUS WITHOUT COMPLICATION, WITHOUT LONG-TERM CURRENT USE OF INSULIN (MULTI): ICD-10-CM

## 2024-12-23 DIAGNOSIS — I10 PRIMARY HYPERTENSION: ICD-10-CM

## 2024-12-23 DIAGNOSIS — I25.83 CORONARY ARTERY DISEASE DUE TO LIPID RICH PLAQUE: ICD-10-CM

## 2024-12-23 RX ORDER — CEFDINIR 300 MG/1
300 CAPSULE ORAL 2 TIMES DAILY
COMMUNITY
Start: 2024-12-21 | End: 2024-12-31

## 2024-12-23 RX ORDER — SULFAMETHOXAZOLE AND TRIMETHOPRIM 800; 160 MG/1; MG/1
1 TABLET ORAL 2 TIMES DAILY
COMMUNITY
Start: 2024-12-22 | End: 2025-01-01

## 2024-12-23 NOTE — PROGRESS NOTES
Discharge facility: [facility]  Discharge diagnosis: Diabetic infection of right foot  Admission date: 12/19/2024  Discharge date: 12/21/2024    PCP Appointment Date: Not made-sent back to hospital   Specialist Appointment Date: 12/26/2024-Wound center   Hospital Encounter and Summary: Linked   See Discharge assessment below for further details         Medications  Medications reviewed with patient/caregiver?: Yes (12/23/2024 12:00 PM)  Is the patient having any side effects they believe may be caused by any medication additions or changes?: No (12/23/2024 12:00 PM)  Does the patient have all medications ordered at discharge?: Yes (12/23/2024 12:00 PM)  Is the patient taking all medications as directed (includes completed medication regime)?: No (12/23/2024 12:00 PM)  What is preventing the patient from taking all medications as directed?: Desires to consult PCP first (12/23/2024 12:00 PM)  Care Management Interventions: Notified provider (12/23/2024 12:00 PM)  Medication Comments: Concern raised by wife about interaction between Ramipril and Bactrim. Per [employer], can cause hyperkalemia. Needs BMP in 3 days. OK per Dr. Siegel to order. (12/23/2024 12:00 PM)  Follow Up Tasks: Script issues (12/23/2024 12:00 PM)    Appointments  Does the patient have a primary care provider?: Yes (12/23/2024 12:00 PM)  Care Management Interventions: Urgent appointment needed (12/23/2024 12:00 PM)  Has the patient kept scheduled appointments due by today?: Yes (12/23/2024 12:00 PM)    Self Management  What is the home health agency?: N/A (12/23/2024 12:00 PM)  Has home health visited the patient within 72 hours of discharge?: Not applicable (12/23/2024 12:00 PM)    Patient Teaching  Does the patient have access to their discharge instructions?: Yes (12/23/2024 12:00 PM)  Care Management Interventions: Reviewed instructions with patient (12/23/2024 12:00 PM)  What is the patient's perception of their  health status since discharge?: Same (12/23/2024 12:00 PM)  Patient/Caregiver Education Comments: Discussed Hospitalization with wife. Patient was instructed to go there by the wound center NP. Patient did not have a nice hospital stay. Per his wife, he had multiple consults but no actual action was taken for the wound on his leg. She states that they could not decide whether or not he needed to have a biopsy vs MRI and nothing was done in the 2 days he was there. He opted to leave since they would not get him an MRI possibly until Monday (today). He was told by the hospitalist that if he leaves, he is on his own and his PCP will need to manage him. So now he is calling us asking PCP to order an MRI and also that there is an interaction with his ramipril and bactrim that he was prescribed. Per Miguel, the interation is possible hyperkalemia, so ok per Dr. Siegel to order a BMP like stated on his hospital discharge summary. In all actuality, patient needs to be back in the Hospital so they can properly do his work up. This is not something that can be done as an outpatient. Dr. Siegel realizes that we are approaching a holiday but it would not be appropriate to order an MRI that he won't be able to have done reasonably until after the new year. Patient does still have a follow up with the wound center on Thursday. She suggested I call them to see if they will order the MRI. I did call but his provider is out of the office until Thursday. I spoke to Dr. Siegel who said this patient belongs back in the ER. Since [facility] did not help them, we did recommend going to [facility] in [place] through the ER. Patient's wife is very understanding and willing to follow whatever instructions are given, so she will have him go there. (12/23/2024 12:00 PM)

## 2024-12-23 NOTE — PROGRESS NOTES
Spoke to patient's wife today regarding his recent admission to [facility] for his RLE wound. Patient had a terrible experience there and ended up leaving because they were not coming up with a plan for him and just let him sit in a hospital bed for 2 days. After discussion with wife and PCP, it was determined patient really does belong back in the hospital. There is a concern he could lose his leg if not getting the proper treatment and we cannot complete the workup in an outpatient setting in the timely manner he needs. Patient's wife is willing to follow our recommendation but will not take him back to [facility]. I did suggest [facility] in [place]. She verbalized understanding and would get him there. I did let her know that I would come on as patient's nurse care manager and would stay in contact with them and watch for his hospital course/discharge from [facility]. I will help coordinate his care along the way. There is no billing or payment needed for this service as he has Traditional Medicare.

## 2024-12-31 ENCOUNTER — PATIENT OUTREACH (OUTPATIENT)
Dept: PRIMARY CARE | Facility: CLINIC | Age: 69
End: 2024-12-31
Payer: MEDICARE

## 2024-12-31 RX ORDER — OXYCODONE HYDROCHLORIDE 5 MG/1
5 TABLET ORAL EVERY 4 HOURS PRN
COMMUNITY
Start: 2024-12-30 | End: 2025-01-04

## 2024-12-31 RX ORDER — FLUCONAZOLE 200 MG/1
200 TABLET ORAL
COMMUNITY
Start: 2024-12-30

## 2024-12-31 RX ORDER — CEFTRIAXONE 2 G/50ML
2000 INJECTION, SOLUTION INTRAVENOUS EVERY 24 HOURS
COMMUNITY
Start: 2024-12-30 | End: 2025-02-03

## 2024-12-31 NOTE — PROGRESS NOTES
Discharge facility: [facility].   Discharge diagnosis: Non-pressure chronic ulcer of right ankle with fat layer exposed, Acute kidney injury, Long term (current) use of antibiotics;   GBS (group B streptococcus) infection    Admission date: 12/23/2024  Discharge date: 12/30/2024    PCP Appointment Date: 1/8/2025  Specialist Appointment Date: D  Hospital Encounter and Summary: Linked   See Discharge assessment below for further details     TCM follow up point: Partly calcified left renal lesion of undetermined clinical significance.     Medications  Medications reviewed with patient/caregiver?: Yes (12/31/2024 10:21 AM)  Is the patient having any side effects they believe may be caused by any medication additions or changes?: No (12/31/2024 10:21 AM)  Does the patient have all medications ordered at discharge?: Yes (12/31/2024 10:21 AM)  Care Management Interventions: No intervention needed (12/31/2024 10:21 AM)  Prescription Comments: Added Fluconazole, IV Ceftriaxone and Oxycodone PRN to med list. IV Ceftriaxone administered at [facility] center at [facility] (12/31/2024 10:21 AM)  Is the patient taking all medications as directed (includes completed medication regime)?: Yes (12/31/2024 10:21 AM)  Medication Comments: Removed Ramipril and Janumet from med list. Discontinued due to liver and kidney effects per wife Giuliana (12/31/2024 10:21 AM)    Appointments  Does the patient have a primary care provider?: Yes (12/31/2024 10:21 AM)  Care Management Interventions: Verified appointment date/time/provider (12/31/2024 10:21 AM)  Has the patient kept scheduled appointments due by today?: Yes (12/31/2024 10:21 AM)  Care Management Interventions: Advised patient to keep appointment; Advised to schedule with specialist (12/31/2024 10:21 AM)    Self Management  What is the home health agency?: N/A (12/31/2024 10:21 AM)  Has home health visited the patient within 72 hours of discharge?: Not applicable  (12/31/2024 10:21 AM)    Patient Teaching  Does the patient have access to their discharge instructions?: Yes (12/31/2024 10:21 AM)  Care Management Interventions: Reviewed instructions with patient (12/31/2024 10:21 AM)  What is the patient's perception of their health status since discharge?: Improving (12/31/2024 10:21 AM)  Is the patient/caregiver able to teach back the hierarchy of who to call/visit for symptoms/problems? PCP, Specialist, Home Health nurse, Urgent Care, ED, 911: Yes (12/31/2024 10:21 AM)  Patient/Caregiver Education Comments: Spoke to wife Giuliana regarding recent hospitalization. Patient is happy to be home. He will be going to an outpatient location daily for his IV antibiotic. He had a PICC line placed prior to discharge from the hospital. There was an option to do it at home, but because of medicare, he would have had to pay over $2,000 for the supplies alone. If he goes to the center daily, it is covered. They only live about 10 mins away from the hospital, so it is not too much of an inconvenience. We reviewed medications and his med list has been updated. He was placed on Fluconazole 200 mg daily since he had yeast growing in his wound along with Group B strep. He is on Ceftriaxone IV daily until 2/3. He has a follow up scheduled already with ID on 2/5/25. His discharge instructions also instructed him to make an appt with [facility] wound care center as well as a Podiatrist in [place]. She has not made those appts but states she will. Overall patient's pain has improved. He is walking on his leg without too much difficulty. He has some stiffness, but he can get around. He also does not have much range of motion in his ankle which is why Giuliana believes he was referred to Podiatry. He does have an appt with Dr. Siegel scheduled for 1/8 and he plans on keeping this. He was taken off his ramipril and janumet due to kidney and liver damage that was caused by sepsis. There are no instructions as to  when he needs to be having any further lab work or when he may be able to resume the medication. I do also see that they found a partially calcified renal lesion on his left kidney that is recommended to have a CT or MRI as an outpatient for follow up. That will need to be discussed with PCP at his appt. (12/31/2024 10:21 AM)

## 2025-01-08 ENCOUNTER — APPOINTMENT (OUTPATIENT)
Dept: PRIMARY CARE | Facility: CLINIC | Age: 70
End: 2025-01-08
Payer: COMMERCIAL

## 2025-01-08 VITALS
WEIGHT: 188.4 LBS | HEART RATE: 55 BPM | DIASTOLIC BLOOD PRESSURE: 76 MMHG | HEIGHT: 70 IN | SYSTOLIC BLOOD PRESSURE: 140 MMHG | OXYGEN SATURATION: 100 % | BODY MASS INDEX: 26.97 KG/M2

## 2025-01-08 DIAGNOSIS — Z12.5 SCREENING FOR PROSTATE CANCER: ICD-10-CM

## 2025-01-08 DIAGNOSIS — I25.10 ARTERIOSCLEROSIS OF CORONARY ARTERY: ICD-10-CM

## 2025-01-08 DIAGNOSIS — L88 PYODERMA GANGRENOSA (MULTI): ICD-10-CM

## 2025-01-08 DIAGNOSIS — F32.A DEPRESSIVE DISORDER: ICD-10-CM

## 2025-01-08 DIAGNOSIS — E11.9 TYPE 2 DIABETES MELLITUS WITHOUT COMPLICATION, WITHOUT LONG-TERM CURRENT USE OF INSULIN (MULTI): ICD-10-CM

## 2025-01-08 DIAGNOSIS — L97.312 NON-PRESSURE CHRONIC ULCER OF RIGHT ANKLE WITH FAT LAYER EXPOSED (MULTI): ICD-10-CM

## 2025-01-08 DIAGNOSIS — Z00.00 HEALTHCARE MAINTENANCE: Primary | ICD-10-CM

## 2025-01-08 DIAGNOSIS — E78.01 FAMILIAL HYPERCHOLESTEROLEMIA: ICD-10-CM

## 2025-01-08 DIAGNOSIS — I77.819 DILATION OF AORTA (CMS-HCC): ICD-10-CM

## 2025-01-08 DIAGNOSIS — I10 PRIMARY HYPERTENSION: ICD-10-CM

## 2025-01-08 DIAGNOSIS — M79.604 PAIN IN RIGHT LEG: ICD-10-CM

## 2025-01-08 DIAGNOSIS — A49.1 GBS (GROUP B STREPTOCOCCUS) INFECTION: ICD-10-CM

## 2025-01-08 DIAGNOSIS — Z00.00 ROUTINE GENERAL MEDICAL EXAMINATION AT HEALTH CARE FACILITY: ICD-10-CM

## 2025-01-08 PROBLEM — S81.809A NON-HEALING WOUND OF LOWER EXTREMITY: Status: ACTIVE | Noted: 2024-12-20

## 2025-01-08 PROBLEM — L08.9 DIABETIC INFECTION OF RIGHT FOOT: Status: ACTIVE | Noted: 2024-12-19

## 2025-01-08 PROBLEM — R94.31 ABNORMAL ELECTROCARDIOGRAPHY: Status: ACTIVE | Noted: 2025-01-08

## 2025-01-08 PROBLEM — I87.313 CHRONIC VENOUS HYPERTENSION (IDIOPATHIC) WITH ULCER OF BILATERAL LOWER EXTREMITY (CODE): Status: ACTIVE | Noted: 2025-01-07

## 2025-01-08 PROBLEM — E11.628 DIABETIC INFECTION OF RIGHT FOOT: Status: ACTIVE | Noted: 2024-12-19

## 2025-01-08 PROBLEM — N20.0 CALCULUS OF KIDNEY: Status: ACTIVE | Noted: 2025-01-08

## 2025-01-08 PROCEDURE — 1123F ACP DISCUSS/DSCN MKR DOCD: CPT | Performed by: FAMILY MEDICINE

## 2025-01-08 PROCEDURE — 1158F ADVNC CARE PLAN TLK DOCD: CPT | Performed by: FAMILY MEDICINE

## 2025-01-08 PROCEDURE — 3078F DIAST BP <80 MM HG: CPT | Performed by: FAMILY MEDICINE

## 2025-01-08 PROCEDURE — 1170F FXNL STATUS ASSESSED: CPT | Performed by: FAMILY MEDICINE

## 2025-01-08 PROCEDURE — G0439 PPPS, SUBSEQ VISIT: HCPCS | Performed by: FAMILY MEDICINE

## 2025-01-08 PROCEDURE — 1160F RVW MEDS BY RX/DR IN RCRD: CPT | Performed by: FAMILY MEDICINE

## 2025-01-08 PROCEDURE — 99397 PER PM REEVAL EST PAT 65+ YR: CPT | Performed by: FAMILY MEDICINE

## 2025-01-08 PROCEDURE — 1159F MED LIST DOCD IN RCRD: CPT | Performed by: FAMILY MEDICINE

## 2025-01-08 PROCEDURE — 3008F BODY MASS INDEX DOCD: CPT | Performed by: FAMILY MEDICINE

## 2025-01-08 PROCEDURE — 3077F SYST BP >= 140 MM HG: CPT | Performed by: FAMILY MEDICINE

## 2025-01-08 PROCEDURE — 99214 OFFICE O/P EST MOD 30 MIN: CPT | Performed by: FAMILY MEDICINE

## 2025-01-08 RX ORDER — COLLAGENASE SANTYL 250 [ARB'U]/G
OINTMENT TOPICAL
COMMUNITY
Start: 2025-01-03 | End: 2025-04-07

## 2025-01-08 RX ORDER — PREDNISONE 10 MG/1
10 TABLET ORAL
COMMUNITY
Start: 2025-01-07 | End: 2025-07-06

## 2025-01-08 RX ORDER — LIDOCAINE HYDROCHLORIDE 20 MG/ML
SOLUTION OROPHARYNGEAL
COMMUNITY
Start: 2024-12-05

## 2025-01-08 ASSESSMENT — ENCOUNTER SYMPTOMS
APPETITE CHANGE: 0
EYE DISCHARGE: 0
CHILLS: 0
CONSTIPATION: 0
FEVER: 0
LOSS OF SENSATION IN FEET: 0
WHEEZING: 0
DEPRESSION: 0
COLOR CHANGE: 0
PALPITATIONS: 0
ARTHRALGIAS: 0
CONFUSION: 0
FACIAL SWELLING: 0
ACTIVITY CHANGE: 0
OCCASIONAL FEELINGS OF UNSTEADINESS: 0
COUGH: 0
DIARRHEA: 0

## 2025-01-08 ASSESSMENT — PATIENT HEALTH QUESTIONNAIRE - PHQ9
10. IF YOU CHECKED OFF ANY PROBLEMS, HOW DIFFICULT HAVE THESE PROBLEMS MADE IT FOR YOU TO DO YOUR WORK, TAKE CARE OF THINGS AT HOME, OR GET ALONG WITH OTHER PEOPLE: NOT DIFFICULT AT ALL
SUM OF ALL RESPONSES TO PHQ9 QUESTIONS 1 AND 2: 0
2. FEELING DOWN, DEPRESSED OR HOPELESS: NOT AT ALL
1. LITTLE INTEREST OR PLEASURE IN DOING THINGS: NOT AT ALL

## 2025-01-08 ASSESSMENT — ACTIVITIES OF DAILY LIVING (ADL)
BATHING: INDEPENDENT
MANAGING_FINANCES: INDEPENDENT
DOING_HOUSEWORK: INDEPENDENT
GROCERY_SHOPPING: INDEPENDENT
TAKING_MEDICATION: INDEPENDENT
DRESSING: INDEPENDENT

## 2025-01-08 NOTE — ASSESSMENT & PLAN NOTE
2024 [facility], still going through treatment with IV antibiotics  stable

## 2025-01-08 NOTE — PROGRESS NOTES
"Subjective   Reason for Visit: Kiko Forrest is an 69 y.o. male here for a Medicare Wellness visit.     Past Medical, Surgical, and Family History reviewed and updated in chart.    Reviewed all medications by prescribing practitioner or clinical pharmacist (such as prescriptions, OTCs, herbal therapies and supplements) and documented in the medical record.    HPI     Patient presents for physical exam. Patient is a known diabetic.     Fam Hx  Mom () living, dementia  Dad ()     Exercise walks 5x/week  ETOH every other week  Caffeine 30 oz of coffee/day  Tobacco 1/2 ppd   at [employer], retired     Colonoscopy 2019 Dr. Jung due 2024     Patient denies other complaints.   Patient Self Assessment of Health Status  Patient Self Assessment: Good    Nutrition and Exercise  Current Diet: Well Balanced Diet  Adequate Fluid Intake: Yes  Caffeine: Yes  Exercise Frequency: No Exercise    Functional Ability/Level of Safety  Cognitive Impairment Observed: No cognitive impairment observed  Cognitive Impairment Reported: No cognitive impairment reported by patient or family    Home Safety Risk Factors: None    Patient Care Team:  Jeaneth Siegel DO as PCP - General  Luz Marina Zamora RN as Care Manager (Case Management)     Review of Systems   Constitutional:  Negative for activity change, appetite change, chills and fever.   HENT:  Negative for congestion, ear pain and facial swelling.    Eyes:  Negative for discharge.   Respiratory:  Negative for cough and wheezing.    Cardiovascular:  Negative for chest pain, palpitations and leg swelling.   Gastrointestinal:  Negative for constipation and diarrhea.   Musculoskeletal:  Negative for arthralgias.   Skin:  Negative for color change and pallor.   Neurological:  Negative for syncope.   Psychiatric/Behavioral:  Negative for confusion.        Objective   Vitals:  /76 (BP Location: Right arm, Patient Position: Sitting)   Pulse 55   Ht 1.784 m (5' 10.25\")   Wt 85.5 " kg (188 lb 6.4 oz)   SpO2 100%   BMI 26.84 kg/m²       Physical Exam  Constitutional:       General: He is not in acute distress.     Appearance: Normal appearance. He is not toxic-appearing.   HENT:      Head: Normocephalic.      Right Ear: Tympanic membrane, ear canal and external ear normal.      Left Ear: Tympanic membrane, ear canal and external ear normal.   Eyes:      Conjunctiva/sclera: Conjunctivae normal.      Pupils: Pupils are equal, round, and reactive to light.   Cardiovascular:      Rate and Rhythm: Normal rate and regular rhythm.      Pulses: Normal pulses.      Heart sounds: Normal heart sounds.   Pulmonary:      Effort: No respiratory distress.      Breath sounds: No wheezing, rhonchi or rales.   Abdominal:      General: Bowel sounds are normal. There is no distension.      Palpations: Abdomen is soft.      Tenderness: There is no abdominal tenderness.   Musculoskeletal:         General: No swelling or tenderness.   Skin:     Findings: No lesion or rash.   Neurological:      General: No focal deficit present.      Mental Status: He is alert and oriented to person, place, and time. Mental status is at baseline.      Gait: Gait normal.   Psychiatric:         Mood and Affect: Mood normal.         Behavior: Behavior normal.         Thought Content: Thought content normal.         Judgment: Judgment normal.       Assessment/Plan   Problem List Items Addressed This Visit    None  Visit Diagnoses       Healthcare maintenance    -  Primary            1. Patient's blood work unavailable at this office visit  2. Patient's LDL goal less than 70 secondary to diabetes, current LDL unavailable  3. Patient's triglycerides unavailable, goal triglycerides less than 150, continue on type IV diet  4. Patient's hgbA1c goal less than 6.5, current hemoglobin A1c unavailable. Discussion about no added sugar diet and medication, recheck cmp and HgbA1c in 3 month  5. Colonoscopy 2019 Dr. Jung due 2025  6. Patient to  call if questions or concerns

## 2025-01-08 NOTE — ACP (ADVANCE CARE PLANNING)
Confirming Previous Code Status:   Advance Care Planning Note     Discussion Date: 01/08/25   Discussion Participants: patient    The patient wishes to discuss Advance Care Planning today and the following is a brief summary of our discussion.     Patient has capacity to make their own medical decisions: Yes  Health Care Agent/Surrogate Decision Maker documented in chart: Yes    Documents on file and valid:  Advance Directive/Living Will: No   Health Care Power of : No  Other:     Communication of Medical Status/Prognosis:   stable    Communication of Treatment Goals/Options:   stable    Treatment Decisions  Goals of Care: survival is prioritized, if goals for quality or survival can reasonably be achieved     Follow Up Plan  stable  Team Members  stable  Time Statement: Total face to face time spent on advance care planning was 5 minutes with 16 minutes spent in counseling, including the explanation.    Jeaneth Siegel DO  1/8/2025 8:32 AM

## 2025-01-10 ENCOUNTER — PATIENT OUTREACH (OUTPATIENT)
Dept: PRIMARY CARE | Facility: CLINIC | Age: 70
End: 2025-01-10
Payer: MEDICARE

## 2025-01-10 DIAGNOSIS — L97.312 NON-PRESSURE CHRONIC ULCER OF RIGHT ANKLE WITH FAT LAYER EXPOSED (MULTI): ICD-10-CM

## 2025-01-10 DIAGNOSIS — L88 PYODERMA GANGRENOSA (MULTI): ICD-10-CM

## 2025-01-10 DIAGNOSIS — E11.9 TYPE 2 DIABETES MELLITUS WITHOUT COMPLICATION, WITHOUT LONG-TERM CURRENT USE OF INSULIN (MULTI): ICD-10-CM

## 2025-01-10 NOTE — PROGRESS NOTES
Patient called and left me a message. Call back placed to him. Patient was ordered labs at his last appt but he did not get a paper req. I advised that the order is in the chart and he can come have that done at anytime. He should fast for 12 hours, water and black coffee ok.     Patient also stated how grateful he was for this RNCM and Dr. Siegel with his recent hospitalizations. He states that he is starting to notice some pink skin around the back wound which he was told is a good sign of healing. He states the front one still looks pretty bad, but at least there is a plan going forward. He continues to go daily to the [facility] center for IV antibiotics. That will continue through the rest of January into February. He is now going to the wound center at [facility]. We discussed that they are still trying to get Santyl covered for him. He had to pay about $180 for 3 tubes of the medication. He has a good team working with him and they are closely following him. Hopefully, this wound will heal and he does not lose a limb.     Patient is aware I will watch out for his blood work results and call him with those.

## 2025-01-17 ENCOUNTER — LAB (OUTPATIENT)
Dept: LAB | Facility: LAB | Age: 70
End: 2025-01-17
Payer: MEDICARE

## 2025-01-17 DIAGNOSIS — I10 PRIMARY HYPERTENSION: ICD-10-CM

## 2025-01-17 DIAGNOSIS — E78.01 FAMILIAL HYPERCHOLESTEROLEMIA: ICD-10-CM

## 2025-01-17 DIAGNOSIS — Z12.5 SCREENING FOR PROSTATE CANCER: ICD-10-CM

## 2025-01-17 DIAGNOSIS — E11.9 TYPE 2 DIABETES MELLITUS WITHOUT COMPLICATION, WITHOUT LONG-TERM CURRENT USE OF INSULIN (MULTI): ICD-10-CM

## 2025-01-17 LAB
ALBUMIN SERPL BCP-MCNC: 4.4 G/DL (ref 3.4–5)
ALP SERPL-CCNC: 86 U/L (ref 33–136)
ALT SERPL W P-5'-P-CCNC: 35 U/L (ref 10–52)
ANION GAP SERPL CALC-SCNC: 17 MMOL/L (ref 10–20)
AST SERPL W P-5'-P-CCNC: 18 U/L (ref 9–39)
BASOPHILS # BLD AUTO: 0.09 X10*3/UL (ref 0–0.1)
BASOPHILS NFR BLD AUTO: 0.8 %
BILIRUB SERPL-MCNC: 0.6 MG/DL (ref 0–1.2)
BUN SERPL-MCNC: 40 MG/DL (ref 6–23)
CALCIUM SERPL-MCNC: 10.3 MG/DL (ref 8.6–10.6)
CHLORIDE SERPL-SCNC: 104 MMOL/L (ref 98–107)
CHOLEST SERPL-MCNC: 130 MG/DL (ref 0–199)
CHOLESTEROL/HDL RATIO: 3.7
CO2 SERPL-SCNC: 24 MMOL/L (ref 21–32)
CREAT SERPL-MCNC: 1.66 MG/DL (ref 0.5–1.3)
EGFRCR SERPLBLD CKD-EPI 2021: 45 ML/MIN/1.73M*2
EOSINOPHIL # BLD AUTO: 0.29 X10*3/UL (ref 0–0.7)
EOSINOPHIL NFR BLD AUTO: 2.5 %
ERYTHROCYTE [DISTWIDTH] IN BLOOD BY AUTOMATED COUNT: 13.7 % (ref 11.5–14.5)
EST. AVERAGE GLUCOSE BLD GHB EST-MCNC: 128 MG/DL
GLUCOSE SERPL-MCNC: 159 MG/DL (ref 74–99)
HBA1C MFR BLD: 6.1 %
HCT VFR BLD AUTO: 42.4 % (ref 41–52)
HDLC SERPL-MCNC: 35.3 MG/DL
HGB BLD-MCNC: 13.8 G/DL (ref 13.5–17.5)
IMM GRANULOCYTES # BLD AUTO: 0.08 X10*3/UL (ref 0–0.7)
IMM GRANULOCYTES NFR BLD AUTO: 0.7 % (ref 0–0.9)
LDLC SERPL CALC-MCNC: 63 MG/DL
LYMPHOCYTES # BLD AUTO: 1.81 X10*3/UL (ref 1.2–4.8)
LYMPHOCYTES NFR BLD AUTO: 15.4 %
MCH RBC QN AUTO: 31.9 PG (ref 26–34)
MCHC RBC AUTO-ENTMCNC: 32.5 G/DL (ref 32–36)
MCV RBC AUTO: 98 FL (ref 80–100)
MONOCYTES # BLD AUTO: 0.88 X10*3/UL (ref 0.1–1)
MONOCYTES NFR BLD AUTO: 7.5 %
NEUTROPHILS # BLD AUTO: 8.59 X10*3/UL (ref 1.2–7.7)
NEUTROPHILS NFR BLD AUTO: 73.1 %
NON HDL CHOLESTEROL: 95 MG/DL (ref 0–149)
NRBC BLD-RTO: 0 /100 WBCS (ref 0–0)
PLATELET # BLD AUTO: 244 X10*3/UL (ref 150–450)
POTASSIUM SERPL-SCNC: 4.8 MMOL/L (ref 3.5–5.3)
PROT SERPL-MCNC: 7.1 G/DL (ref 6.4–8.2)
PSA SERPL-MCNC: 5.94 NG/ML
RBC # BLD AUTO: 4.33 X10*6/UL (ref 4.5–5.9)
SODIUM SERPL-SCNC: 140 MMOL/L (ref 136–145)
TRIGL SERPL-MCNC: 158 MG/DL (ref 0–149)
TSH SERPL-ACNC: 2.04 MIU/L (ref 0.44–3.98)
VLDL: 32 MG/DL (ref 0–40)
WBC # BLD AUTO: 11.7 X10*3/UL (ref 4.4–11.3)

## 2025-01-17 PROCEDURE — 80053 COMPREHEN METABOLIC PANEL: CPT

## 2025-01-17 PROCEDURE — 83036 HEMOGLOBIN GLYCOSYLATED A1C: CPT

## 2025-01-17 PROCEDURE — 84443 ASSAY THYROID STIM HORMONE: CPT

## 2025-01-17 PROCEDURE — 85025 COMPLETE CBC W/AUTO DIFF WBC: CPT

## 2025-01-17 PROCEDURE — 80061 LIPID PANEL: CPT

## 2025-01-17 PROCEDURE — G0103 PSA SCREENING: HCPCS

## 2025-01-23 ENCOUNTER — PATIENT OUTREACH (OUTPATIENT)
Dept: PRIMARY CARE | Facility: CLINIC | Age: 70
End: 2025-01-23
Payer: MEDICARE

## 2025-01-23 DIAGNOSIS — L88 PYODERMA GANGRENOSA (MULTI): ICD-10-CM

## 2025-01-23 DIAGNOSIS — E11.9 TYPE 2 DIABETES MELLITUS WITHOUT COMPLICATION, WITHOUT LONG-TERM CURRENT USE OF INSULIN (MULTI): ICD-10-CM

## 2025-01-23 DIAGNOSIS — I10 PRIMARY HYPERTENSION: ICD-10-CM

## 2025-01-23 NOTE — PROGRESS NOTES
Chart review completed prior to [facility] outreach. Outreach deferred until clarification with PCP about follow up labs is completed. I see that he had labs on 1/17 and his creatinine was a little high. She wanted repeat labs in one week. He did have labs done about 5 days later but from a different provider. So I would like her to review those and let me know if he needs even more bw in a certain amount of time.     I did also review his recent wound center note. It appears that he is waiting on insurance approval for hyperbaric oxygen therapy. His steroid did get increased to 20 mg daily. So I updated his med list to reflect that.

## 2025-02-04 NOTE — PROGRESS NOTES
"Subjective   Patient ID: Kiko Forrest \"Robson" is a 70 y.o. male who presents for Follow-up (Right lower leg wound. ).    HPI   Patient reports that wound on right lower leg is much better has been following with [facility].  He has been doing hyperbaric treatments.    Review of Systems   Constitutional:  Negative for activity change, appetite change, chills and fever.   HENT:  Negative for congestion, ear pain and facial swelling.    Eyes:  Negative for discharge.   Respiratory:  Negative for cough and wheezing.    Cardiovascular:  Negative for chest pain, palpitations and leg swelling.   Gastrointestinal:  Negative for constipation and diarrhea.   Musculoskeletal:  Negative for arthralgias.   Skin:  Negative for color change and pallor.   Neurological:  Negative for syncope.   Psychiatric/Behavioral:  Negative for confusion.        Objective   /80 (BP Location: Left arm, Patient Position: Sitting)   Pulse 76   Wt 88.5 kg (195 lb)   BMI 27.78 kg/m²   BSA Body surface area is 2.09 meters squared.      Physical Exam  Constitutional:       General: He is not in acute distress.     Appearance: Normal appearance. He is not toxic-appearing.   HENT:      Head: Normocephalic.      Right Ear: Tympanic membrane, ear canal and external ear normal.      Left Ear: Tympanic membrane, ear canal and external ear normal.   Eyes:      Conjunctiva/sclera: Conjunctivae normal.      Pupils: Pupils are equal, round, and reactive to light.   Cardiovascular:      Rate and Rhythm: Normal rate and regular rhythm.      Pulses: Normal pulses.      Heart sounds: Normal heart sounds.   Pulmonary:      Effort: No respiratory distress.      Breath sounds: No wheezing or rales.   Musculoskeletal:         General: No swelling or tenderness.   Skin:     Findings: No lesion or rash.   Neurological:      General: No focal deficit present.      Mental Status: He is alert and oriented to person, place, and time. Mental status is at " baseline.      Gait: Gait normal.   Psychiatric:         Mood and Affect: Mood normal.         Behavior: Behavior normal.         Thought Content: Thought content normal.         Judgment: Judgment normal.     Lab on 01/17/2025   Component Date Value Ref Range Status   • Prostate Specific Antigen,Screen 01/17/2025 5.94 (H)  <=4.00 ng/mL Final   • Thyroid Stimulating Hormone 01/17/2025 2.04  0.44 - 3.98 mIU/L Final   • Cholesterol 01/17/2025 130  0 - 199 mg/dL Final          Age      Desirable   Borderline High   High     0-19 Y     0 - 169       170 - 199     >/= 200    20-24 Y     0 - 189       190 - 224     >/= 225         >24 Y     0 - 199       200 - 239     >/= 240   **All ranges are based on fasting samples. Specific   therapeutic targets will vary based on patient-specific   cardiac risk.    Pediatric guidelines reference:Pediatrics 2011, 128(S5).Adult guidelines reference: NCEP ATPIII Guidelines,ML 2001, 258:2486-97    Venipuncture immediately after or during the administration of Metamizole may lead to falsely low results. Testing should be performed immediately prior to Metamizole dosing.   • HDL-Cholesterol 01/17/2025 35.3  mg/dL Final      Age       Very Low   Low     Normal    High    0-19 Y    < 35      < 40     40-45     ----  20-24 Y    ----     < 40      >45      ----        >24 Y      ----     < 40     40-60      >60     • Cholesterol/HDL Ratio 01/17/2025 3.7   Final      Ref Values  Desirable  < 3.4  High Risk  > 5.0   • LDL Calculated 01/17/2025 63  <=99 mg/dL Final                                Near   Borderline      AGE      Desirable  Optimal    High     High     Very High     0-19 Y     0 - 109     ---    110-129   >/= 130     ----    20-24 Y     0 - 119     ---    120-159   >/= 160     ----      >24 Y     0 -  99   100-129  130-159   160-189     >/=190     • VLDL 01/17/2025 32  0 - 40 mg/dL Final   • Triglycerides 01/17/2025 158 (H)  0 - 149 mg/dL Final    Age              Desirable         Borderline         High        Very High  SEX:B           mg/dL             mg/dL               mg/dL      mg/dL  <=14D                       ----               ----        ----  15D-365D                    ----               ----        ----  1Y-9Y           0-74               75-99             >=100       ----  10Y-19Y        0-89                            >=130       ----  20Y-24Y        0-114             115-149             >=150      ----  >= 25Y         0-149             150-199             200-499    >=500      Venipuncture immediately after or during the administration of Metamizole may lead to falsely low results. Testing should be performed immediately prior to Metamizole dosing.   • Non HDL Cholesterol 01/17/2025 95  0 - 149 mg/dL Final          Age       Desirable   Borderline High   High     Very High     0-19 Y     0 - 119       120 - 144     >/= 145    >/= 160    20-24 Y     0 - 149       150 - 189     >/= 190      ----         >24 Y    30 mg/dL above LDL Cholesterol goal     • Glucose 01/17/2025 159 (H)  74 - 99 mg/dL Final   • Sodium 01/17/2025 140  136 - 145 mmol/L Final   • Potassium 01/17/2025 4.8  3.5 - 5.3 mmol/L Final   • Chloride 01/17/2025 104  98 - 107 mmol/L Final   • Bicarbonate 01/17/2025 24  21 - 32 mmol/L Final   • Anion Gap 01/17/2025 17  10 - 20 mmol/L Final   • Urea Nitrogen 01/17/2025 40 (H)  6 - 23 mg/dL Final   • Creatinine 01/17/2025 1.66 (H)  0.50 - 1.30 mg/dL Final   • eGFR 01/17/2025 45 (L)  >60 mL/min/1.73m*2 Final    Calculations of estimated GFR are performed using the 2021 CKD-EPI Study Refit equation without the race variable for the IDMS-Traceable creatinine methods.  https://jasn.asnjournals.org/content/early/2021/09/22/ASN.6548684186   • Calcium 01/17/2025 10.3  8.6 - 10.6 mg/dL Final   • Albumin 01/17/2025 4.4  3.4 - 5.0 g/dL Final   • Alkaline Phosphatase 01/17/2025 86  33 - 136 U/L Final   • Total Protein 01/17/2025 7.1  6.4 - 8.2 g/dL Final   •  AST 01/17/2025 18  9 - 39 U/L Final   • Bilirubin, Total 01/17/2025 0.6  0.0 - 1.2 mg/dL Final   • ALT 01/17/2025 35  10 - 52 U/L Final    Patients treated with Sulfasalazine may generate falsely decreased results for ALT.   • WBC 01/17/2025 11.7 (H)  4.4 - 11.3 x10*3/uL Final   • nRBC 01/17/2025 0.0  0.0 - 0.0 /100 WBCs Final   • RBC 01/17/2025 4.33 (L)  4.50 - 5.90 x10*6/uL Final   • Hemoglobin 01/17/2025 13.8  13.5 - 17.5 g/dL Final   • Hematocrit 01/17/2025 42.4  41.0 - 52.0 % Final   • MCV 01/17/2025 98  80 - 100 fL Final   • MCH 01/17/2025 31.9  26.0 - 34.0 pg Final   • MCHC 01/17/2025 32.5  32.0 - 36.0 g/dL Final   • RDW 01/17/2025 13.7  11.5 - 14.5 % Final   • Platelets 01/17/2025 244  150 - 450 x10*3/uL Final   • Neutrophils % 01/17/2025 73.1  40.0 - 80.0 % Final   • Immature Granulocytes %, Automated 01/17/2025 0.7  0.0 - 0.9 % Final    Immature Granulocyte Count (IG) includes promyelocytes, myelocytes and metamyelocytes but does not include bands. Percent differential counts (%) should be interpreted in the context of the absolute cell counts (cells/UL).   • Lymphocytes % 01/17/2025 15.4  13.0 - 44.0 % Final   • Monocytes % 01/17/2025 7.5  2.0 - 10.0 % Final   • Eosinophils % 01/17/2025 2.5  0.0 - 6.0 % Final   • Basophils % 01/17/2025 0.8  0.0 - 2.0 % Final   • Neutrophils Absolute 01/17/2025 8.59 (H)  1.20 - 7.70 x10*3/uL Final    Percent differential counts (%) should be interpreted in the context of the absolute cell counts (cells/uL).   • Immature Granulocytes Absolute, Au* 01/17/2025 0.08  0.00 - 0.70 x10*3/uL Final   • Lymphocytes Absolute 01/17/2025 1.81  1.20 - 4.80 x10*3/uL Final   • Monocytes Absolute 01/17/2025 0.88  0.10 - 1.00 x10*3/uL Final   • Eosinophils Absolute 01/17/2025 0.29  0.00 - 0.70 x10*3/uL Final   • Basophils Absolute 01/17/2025 0.09  0.00 - 0.10 x10*3/uL Final   • Hemoglobin A1C 01/17/2025 6.1 (H)  See comment % Final   • Estimated Average Glucose 01/17/2025 128  Not  Established mg/dL Final   Office Visit on 09/14/2024   Component Date Value Ref Range Status   • POC HEMOGLOBIN A1c 09/14/2024 6.6 (A)  4.2 - 6.5 % Final     Current Outpatient Medications on File Prior to Visit   Medication Sig Dispense Refill   • aspirin 81 mg EC tablet Take 1 tablet (81 mg) by mouth 2 times a day.     • atorvastatin (Lipitor) 80 mg tablet TAKE 1 TABLET BY MOUTH ONCE DAILY 90 tablet 0   • cyanocobalamin, vitamin B-12, 3,000 mcg lozenge Place under the tongue once daily.     • fluconazole (Diflucan) 200 mg tablet Take 1 tablet (200 mg) by mouth once daily.     • gabapentin (Neurontin) 300 mg capsule Take 1 capsule (300 mg) by mouth once daily at bedtime.     • lidocaine (Xylocaine) 2 % solution Apply to wound once daily     • metoprolol tartrate (Lopressor) 25 mg tablet TAKE 1 TABLET BY MOUTH TWO TIMES A  tablet 0   • predniSONE (Deltasone) 10 mg tablet Take 1 tablet (10 mg) by mouth once daily. (Patient taking differently: Take 2 tablets (20 mg) by mouth once daily.)     • SantyL 250 unit/gram ointment Apply topically once daily.     • [DISCONTINUED] cefTRIAXone (Rocephin) 2 gram/50 mL IV Infuse 50 mL (2,000 mg) into a venous catheter once every 24 hours.       No current facility-administered medications on file prior to visit.     No images are attached to the encounter.        Assessment/Plan   Diagnoses and all orders for this visit:  Type 2 diabetes mellitus without complication, without long-term current use of insulin (Multi)  Non-pressure chronic ulcer of right ankle with fat layer exposed (Multi)  Chronic kidney disease, stage 3a (Multi)    1.  Patient to have additional labs performed in 1 week CMP  2.  Patient to continue to follow with [facility] hyperbaric and wound center  3.  Patient to call if questions or concerns

## 2025-02-05 ENCOUNTER — APPOINTMENT (OUTPATIENT)
Dept: PRIMARY CARE | Facility: CLINIC | Age: 70
End: 2025-02-05
Payer: MEDICARE

## 2025-02-05 VITALS
SYSTOLIC BLOOD PRESSURE: 142 MMHG | DIASTOLIC BLOOD PRESSURE: 80 MMHG | BODY MASS INDEX: 27.78 KG/M2 | WEIGHT: 195 LBS | HEART RATE: 76 BPM

## 2025-02-05 DIAGNOSIS — E11.9 TYPE 2 DIABETES MELLITUS WITHOUT COMPLICATION, WITHOUT LONG-TERM CURRENT USE OF INSULIN (MULTI): Primary | ICD-10-CM

## 2025-02-05 DIAGNOSIS — L97.312 NON-PRESSURE CHRONIC ULCER OF RIGHT ANKLE WITH FAT LAYER EXPOSED (MULTI): ICD-10-CM

## 2025-02-05 DIAGNOSIS — N18.31 CHRONIC KIDNEY DISEASE, STAGE 3A (MULTI): ICD-10-CM

## 2025-02-05 PROBLEM — S81.809A NON-HEALING WOUND OF LOWER EXTREMITY: Status: RESOLVED | Noted: 2024-12-20 | Resolved: 2025-02-05

## 2025-02-05 PROBLEM — A49.1 GBS (GROUP B STREPTOCOCCUS) INFECTION: Status: RESOLVED | Noted: 2024-12-30 | Resolved: 2025-02-05

## 2025-02-05 PROBLEM — L88: Status: RESOLVED | Noted: 2025-01-07 | Resolved: 2025-02-05

## 2025-02-05 PROBLEM — E11.622 TYPE 2 DIABETES MELLITUS WITH OTHER SKIN ULCER (CODE): Status: ACTIVE | Noted: 2025-01-21

## 2025-02-05 PROCEDURE — 3044F HG A1C LEVEL LT 7.0%: CPT | Performed by: FAMILY MEDICINE

## 2025-02-05 PROCEDURE — 1123F ACP DISCUSS/DSCN MKR DOCD: CPT | Performed by: FAMILY MEDICINE

## 2025-02-05 PROCEDURE — 3077F SYST BP >= 140 MM HG: CPT | Performed by: FAMILY MEDICINE

## 2025-02-05 PROCEDURE — 3048F LDL-C <100 MG/DL: CPT | Performed by: FAMILY MEDICINE

## 2025-02-05 PROCEDURE — 1159F MED LIST DOCD IN RCRD: CPT | Performed by: FAMILY MEDICINE

## 2025-02-05 PROCEDURE — 99214 OFFICE O/P EST MOD 30 MIN: CPT | Performed by: FAMILY MEDICINE

## 2025-02-05 PROCEDURE — 3079F DIAST BP 80-89 MM HG: CPT | Performed by: FAMILY MEDICINE

## 2025-02-05 PROCEDURE — 1160F RVW MEDS BY RX/DR IN RCRD: CPT | Performed by: FAMILY MEDICINE

## 2025-02-05 RX ORDER — GABAPENTIN 300 MG/1
300 CAPSULE ORAL NIGHTLY
COMMUNITY
Start: 2025-01-21 | End: 2025-07-20

## 2025-02-05 ASSESSMENT — ENCOUNTER SYMPTOMS
DEPRESSION: 0
FEVER: 0
CHILLS: 0
CONSTIPATION: 0
WHEEZING: 0
OCCASIONAL FEELINGS OF UNSTEADINESS: 0
DIARRHEA: 0
APPETITE CHANGE: 0
ACTIVITY CHANGE: 0
EYE DISCHARGE: 0
ARTHRALGIAS: 0
CONFUSION: 0
PALPITATIONS: 0
LOSS OF SENSATION IN FEET: 0
FACIAL SWELLING: 0
COLOR CHANGE: 0
COUGH: 0

## 2025-02-05 ASSESSMENT — PATIENT HEALTH QUESTIONNAIRE - PHQ9
10. IF YOU CHECKED OFF ANY PROBLEMS, HOW DIFFICULT HAVE THESE PROBLEMS MADE IT FOR YOU TO DO YOUR WORK, TAKE CARE OF THINGS AT HOME, OR GET ALONG WITH OTHER PEOPLE: NOT DIFFICULT AT ALL
1. LITTLE INTEREST OR PLEASURE IN DOING THINGS: NOT AT ALL
2. FEELING DOWN, DEPRESSED OR HOPELESS: NOT AT ALL
SUM OF ALL RESPONSES TO PHQ9 QUESTIONS 1 AND 2: 0

## 2025-02-14 LAB
ALBUMIN SERPL-MCNC: 4.6 G/DL (ref 3.6–5.1)
ALP SERPL-CCNC: 63 U/L (ref 35–144)
ALT SERPL-CCNC: 94 U/L (ref 9–46)
ANION GAP SERPL CALCULATED.4IONS-SCNC: 8 MMOL/L (CALC) (ref 7–17)
AST SERPL-CCNC: 27 U/L (ref 10–35)
BILIRUB SERPL-MCNC: 0.5 MG/DL (ref 0.2–1.2)
BUN SERPL-MCNC: 63 MG/DL (ref 7–25)
CALCIUM SERPL-MCNC: 9.8 MG/DL (ref 8.6–10.3)
CHLORIDE SERPL-SCNC: 108 MMOL/L (ref 98–110)
CO2 SERPL-SCNC: 22 MMOL/L (ref 20–32)
CREAT SERPL-MCNC: 1.61 MG/DL (ref 0.7–1.35)
EGFRCR SERPLBLD CKD-EPI 2021: 46 ML/MIN/1.73M2
GLUCOSE SERPL-MCNC: 154 MG/DL (ref 65–99)
POTASSIUM SERPL-SCNC: 5.7 MMOL/L (ref 3.5–5.3)
PROT SERPL-MCNC: 6.6 G/DL (ref 6.1–8.1)
SODIUM SERPL-SCNC: 138 MMOL/L (ref 135–146)

## 2025-02-17 DIAGNOSIS — I10 PRIMARY HYPERTENSION: ICD-10-CM

## 2025-02-17 RX ORDER — METOPROLOL TARTRATE 25 MG/1
25 TABLET, FILM COATED ORAL 2 TIMES DAILY
Qty: 180 TABLET | Refills: 0 | Status: SHIPPED | OUTPATIENT
Start: 2025-02-17

## 2025-02-20 ENCOUNTER — PATIENT OUTREACH (OUTPATIENT)
Dept: PRIMARY CARE | Facility: CLINIC | Age: 70
End: 2025-02-20
Payer: MEDICARE

## 2025-02-20 DIAGNOSIS — E11.9 TYPE 2 DIABETES MELLITUS WITHOUT COMPLICATION, WITHOUT LONG-TERM CURRENT USE OF INSULIN (MULTI): ICD-10-CM

## 2025-02-20 DIAGNOSIS — L97.312 NON-PRESSURE CHRONIC ULCER OF RIGHT ANKLE WITH FAT LAYER EXPOSED (MULTI): ICD-10-CM

## 2025-02-20 NOTE — PROGRESS NOTES
Patient calls in with a question about lab work he needs to get done. He is asking if there is only the one order active? He thought he had an old order on file and he wants to make sure they don't draw that. I did do a chart review and found an old lab order from 3/12/24 that was still active. This was a full panel for a yearly appt and he had all the testing done in January 2025, but the order was still there. I tried to cancel though the Enter/Edit tab but I ended up having to go back to the encounter from 3/12/24 to cancel that way. Now he only has the repeat CMP ordered on 2/17/25 by Dr. Siegel. He states he is planning on getting that done maybe Friday or Monday. He is aware he can go to any [facility] location to have done.     I did review Dr. Sieegl las office note. She reports that patient's leg wound is looking much better. He states it is doing well. He has been doing hyperbaric oxygen therapy and he also got his first skin graft. He states it wasn't like a true skin graft, but a collagen powder that they apply to the wound and cover with a dressing. He states he will show me the next time he is in.     Patient denies any other needs. He is aware he can call me with any other questions or concerns.     Last PCP appt: 2/5/2025  Next PCP appt: No appt found

## 2025-02-26 LAB
ALBUMIN SERPL-MCNC: 4.4 G/DL (ref 3.6–5.1)
ALP SERPL-CCNC: 59 U/L (ref 35–144)
ALT SERPL-CCNC: 34 U/L (ref 9–46)
ANION GAP SERPL CALCULATED.4IONS-SCNC: 7 MMOL/L (CALC) (ref 7–17)
AST SERPL-CCNC: 13 U/L (ref 10–35)
BILIRUB SERPL-MCNC: 0.6 MG/DL (ref 0.2–1.2)
BUN SERPL-MCNC: 53 MG/DL (ref 7–25)
CALCIUM SERPL-MCNC: 9.8 MG/DL (ref 8.6–10.3)
CHLORIDE SERPL-SCNC: 108 MMOL/L (ref 98–110)
CO2 SERPL-SCNC: 25 MMOL/L (ref 20–32)
CREAT SERPL-MCNC: 1.79 MG/DL (ref 0.7–1.35)
EGFRCR SERPLBLD CKD-EPI 2021: 41 ML/MIN/1.73M2
GLUCOSE SERPL-MCNC: 150 MG/DL (ref 65–99)
POTASSIUM SERPL-SCNC: 5.6 MMOL/L (ref 3.5–5.3)
PROT SERPL-MCNC: 6.4 G/DL (ref 6.1–8.1)
SODIUM SERPL-SCNC: 140 MMOL/L (ref 135–146)

## 2025-02-27 DIAGNOSIS — R79.89 ELEVATED SERUM CREATININE: ICD-10-CM

## 2025-02-27 DIAGNOSIS — E87.5 HYPERKALEMIA: ICD-10-CM

## 2025-03-03 ENCOUNTER — PATIENT OUTREACH (OUTPATIENT)
Dept: PRIMARY CARE | Facility: CLINIC | Age: 70
End: 2025-03-03
Payer: MEDICARE

## 2025-03-03 DIAGNOSIS — L97.312 NON-PRESSURE CHRONIC ULCER OF RIGHT ANKLE WITH FAT LAYER EXPOSED (MULTI): ICD-10-CM

## 2025-03-03 DIAGNOSIS — E11.9 TYPE 2 DIABETES MELLITUS WITHOUT COMPLICATION, WITHOUT LONG-TERM CURRENT USE OF INSULIN (MULTI): ICD-10-CM

## 2025-03-03 DIAGNOSIS — N18.31 CHRONIC KIDNEY DISEASE, STAGE 3A (MULTI): ICD-10-CM

## 2025-03-03 NOTE — PROGRESS NOTES
Call received from patient. He is due to have repeat labs done this week to check his kidney function. He is supposed to hydrate and recheck but he states that he is going in for hyperbaric oxygen treatment and once he is in there, he can't get out. So he can't push a lot of fluids in the morning. He is free next week if that would be ok. I told him that would be fine. The main thing is that he continues to hydrate outside of the time he is in his treatment (so when he can) and to make sure to complete the blood work. He states he definitely get it done. I will put a reminder in his chart to follow up on this later next week.

## 2025-03-10 LAB
ALBUMIN SERPL-MCNC: 4.5 G/DL (ref 3.6–5.1)
ALP SERPL-CCNC: 60 U/L (ref 35–144)
ALT SERPL-CCNC: 64 U/L (ref 9–46)
ANION GAP SERPL CALCULATED.4IONS-SCNC: 11 MMOL/L (CALC) (ref 7–17)
AST SERPL-CCNC: 20 U/L (ref 10–35)
BILIRUB SERPL-MCNC: 0.4 MG/DL (ref 0.2–1.2)
BUN SERPL-MCNC: 51 MG/DL (ref 7–25)
CALCIUM SERPL-MCNC: 9.5 MG/DL (ref 8.6–10.3)
CHLORIDE SERPL-SCNC: 102 MMOL/L (ref 98–110)
CO2 SERPL-SCNC: 23 MMOL/L (ref 20–32)
CREAT SERPL-MCNC: 1.59 MG/DL (ref 0.7–1.35)
EGFRCR SERPLBLD CKD-EPI 2021: 47 ML/MIN/1.73M2
GLUCOSE SERPL-MCNC: 170 MG/DL (ref 65–99)
POTASSIUM SERPL-SCNC: 5.3 MMOL/L (ref 3.5–5.3)
PROT SERPL-MCNC: 6.4 G/DL (ref 6.1–8.1)
SODIUM SERPL-SCNC: 136 MMOL/L (ref 135–146)

## 2025-03-14 DIAGNOSIS — R79.89 ELEVATED LIVER FUNCTION TESTS: ICD-10-CM

## 2025-03-27 ENCOUNTER — PATIENT OUTREACH (OUTPATIENT)
Dept: PRIMARY CARE | Facility: CLINIC | Age: 70
End: 2025-03-27
Payer: MEDICARE

## 2025-03-27 DIAGNOSIS — I10 PRIMARY HYPERTENSION: ICD-10-CM

## 2025-03-27 DIAGNOSIS — L97.312 NON-PRESSURE CHRONIC ULCER OF RIGHT ANKLE WITH FAT LAYER EXPOSED (MULTI): ICD-10-CM

## 2025-03-27 DIAGNOSIS — N18.31 CHRONIC KIDNEY DISEASE, STAGE 3A (MULTI): ICD-10-CM

## 2025-03-27 DIAGNOSIS — E11.9 TYPE 2 DIABETES MELLITUS WITHOUT COMPLICATION, WITHOUT LONG-TERM CURRENT USE OF INSULIN: ICD-10-CM

## 2025-03-28 ENCOUNTER — PATIENT OUTREACH (OUTPATIENT)
Dept: PRIMARY CARE | Facility: CLINIC | Age: 70
End: 2025-03-28
Payer: MEDICARE

## 2025-03-28 DIAGNOSIS — I10 PRIMARY HYPERTENSION: ICD-10-CM

## 2025-03-28 DIAGNOSIS — L97.312 NON-PRESSURE CHRONIC ULCER OF RIGHT ANKLE WITH FAT LAYER EXPOSED (MULTI): ICD-10-CM

## 2025-03-28 DIAGNOSIS — E11.9 TYPE 2 DIABETES MELLITUS WITHOUT COMPLICATION, WITHOUT LONG-TERM CURRENT USE OF INSULIN: ICD-10-CM

## 2025-03-28 NOTE — PROGRESS NOTES
Call back received from patient. I called him as we received BP readings from his Urologist office and his readings have been elevated. Dr. Siegel wants him to come in for an appt. Patient states that he gets his BP checked 2 times a day on weekdays when he is doing his hyperbaric oxygen treatment. He says he sees 4 doctors a week and they all have said that they aren't surprised by his elevated BP and that the hyperbaric oxygen will increase it as will the prednisone he is taking. Patient also states that he has been pretty inactive since this all started in January and he is just now beginning to walk with out any pain. So he wants to continue to be active and see if that helps drop his BP a little before coming in. He denies any symptoms like headaches, chest pain, dizziness or facial flushing. He appreciates our concern but wants to see how this looks in another 2 weeks. I did remind him that high blood pressure can lead to heart attacks or strokes. I also made sure that he is compliant with his medications which he is. Next outreach to patient scheduled for 2 weeks from today.

## 2025-03-28 NOTE — PROGRESS NOTES
Examples from Urologist: 182/90, 179/96, 163/86. Readings from wound appts: 168/80, 157/95, 163/94, 169/93, 160/87

## 2025-04-11 ENCOUNTER — PATIENT OUTREACH (OUTPATIENT)
Dept: PRIMARY CARE | Facility: CLINIC | Age: 70
End: 2025-04-11
Payer: MEDICARE

## 2025-04-11 DIAGNOSIS — L97.312 NON-PRESSURE CHRONIC ULCER OF RIGHT ANKLE WITH FAT LAYER EXPOSED (MULTI): ICD-10-CM

## 2025-04-11 DIAGNOSIS — I10 PRIMARY HYPERTENSION: ICD-10-CM

## 2025-04-11 RX ORDER — LOSARTAN POTASSIUM 25 MG/1
25 TABLET ORAL DAILY
Qty: 30 TABLET | Refills: 11 | Status: SHIPPED | OUTPATIENT
Start: 2025-04-11 | End: 2026-04-11

## 2025-04-11 NOTE — PROGRESS NOTES
Chart review completed prior to [facility] outreach. Call completed to patient.      Reviewed BP readings from the last 2 weeks taken at the Wound Center M-F during his treatments. BP is still elevated. Readings are as follows:  3/31 157/93  4/1 171/98  4/2 166/94   4/3 157/96  4/4 172/97  4/7 149/96  4/8 144/91  4/9 155/96  4/10 160/100  4/11 174/97    We discussed that he is told by the doctors at [facility] that the Hyperbaric treatment is increasing his pressures. He is also on prednisone which just got decreased from 20 mg to 10 mg and he would like to see if this change results in a decrease of his pressures. He is compliant with his Metoprolol Tartrate 25 mg Twice daily. He is exercising more: weather permitting. He feels good. He denies any headaches, dizziness, confusion or chest pains. He would like to give it more time and see how it looks again in another few weeks. Patient is aware I am sending this to Dr. Siegel for her review and recommendations.

## 2025-04-11 NOTE — PROGRESS NOTES
Patient notified that Dr. Siegel would like him to start on Losartan 25mg daily. Patient was notified. Possibly side effects discussed. Patient feels like his BP will come down once he is done with hyperbaric treatment come the beginning of may. But I reiterated that this high of a BP can leads to stroke or heart attack or worsening kidney damage. We can certainly take him off it once his therapy is completed and if his BP starts to come back to normal. Patient agreeable. Rx pended to PCP.

## 2025-05-06 ENCOUNTER — PATIENT OUTREACH (OUTPATIENT)
Dept: PRIMARY CARE | Facility: CLINIC | Age: 70
End: 2025-05-06
Payer: MEDICARE

## 2025-05-06 DIAGNOSIS — E11.9 TYPE 2 DIABETES MELLITUS WITHOUT COMPLICATION, WITHOUT LONG-TERM CURRENT USE OF INSULIN: ICD-10-CM

## 2025-05-06 DIAGNOSIS — I10 PRIMARY HYPERTENSION: ICD-10-CM

## 2025-05-06 DIAGNOSIS — L97.312 NON-PRESSURE CHRONIC ULCER OF RIGHT ANKLE WITH FAT LAYER EXPOSED (MULTI): ICD-10-CM

## 2025-05-06 DIAGNOSIS — N18.31 CHRONIC KIDNEY DISEASE, STAGE 3A (MULTI): ICD-10-CM

## 2025-05-06 NOTE — PROGRESS NOTES
Care Management Monthly Outreach  Chart review completed  Confirmation of at least 2 patient identifiers  Change in insurance? No    Has patient been to ER/Urgent Care since last outreach? No    Last Office Visit with PCP: 2/5/2025   Next Office Visit with PCP: Visit date not found   [facility] Collaboration: n/a    Chronic Conditions and Outreach Summary:   Type 2 diabetes mellitus without complication, without long-term current use of insulin    Chronic kidney disease, stage 3a (Multi)    Non-pressure chronic ulcer of right ankle with fat layer exposed (Multi)    Primary hypertension    Call placed to patient to review his BP's since starting the Losartan 25 mg. His BP's are lower than they have been although still on the higher side.   BP's as follows:  5/6 162/84  5/5 151/78  4/29 160/89  4/28 154/92  4/25 166/93  4/24 150/91  4/23 151/93  4/22 156/88  4/21 149/87  4/18 147/98  4/17 151/90  4/16 161/95  4/15 158/92  4/14 154/98     Patient reports that he feels good overall. He continues to follow with the wound center now weekly for dressing changes. He is on a 30 day break from hyperbaric treatment per the wound centers notes.     Patient states he went to a Urology appt yesterday and they ordered some labs on him. He states we wont be able to see it in the system but they will be sending them to us for our record. He is unsure what they ordered. I did advise patient that he is overdue for an A1C level. If that is not one of the ones he just had done, I would suggest he have that performed. I will wait to ask PCP once these completed tests have been received in case she wants something else ordered as well.     Medications:   Are there medication changes since last visit? Yes - Losartan 25 mg Added by PCP  Refills needed? No    Social Drivers of Health: Complete  Care Gaps Addressed? Needs Complete  Care Plan addressed: Yes    Upcoming Appointments:     Blood Pressures Reviewed  BP Readings from Last 3 Encounters:    02/05/25 142/80   01/08/25 140/76   11/04/24 120/62     Labs Reviewed:  Lab Results   Component Value Date    CREATININE 1.59 (H) 03/10/2025    GLUCOSE 170 (H) 03/10/2025    ALKPHOS 60 03/10/2025    K 5.3 03/10/2025    PROT 6.4 03/10/2025     03/10/2025    CALCIUM 9.5 03/10/2025    AST 20 03/10/2025    ALT 64 (H) 03/10/2025    BUN 51 (H) 03/10/2025    MG 1.83 04/21/2022    GFRMALE 54 (A) 04/21/2022     Lab Results   Component Value Date    TRIG 158 (H) 01/17/2025    CHOL 130 01/17/2025    LDLCALC 63 01/17/2025    HDL 35.3 01/17/2025     Lab Results   Component Value Date    HGBA1C 6.1 (H) 01/17/2025    HGBA1C 6.6 (A) 09/14/2024    HGBA1C 7.2 (A) 04/21/2022     Lab Results   Component Value Date    WBC 11.7 (H) 01/17/2025    RBC 4.33 (L) 01/17/2025    HGB 13.8 01/17/2025     01/17/2025   No other concerns at this time.  Agreeable to continue monthly outreaches.  Encouraged to call if questions or concerns arise.    Luz Marian Zamora, RN   RN Care Manager   [facility]   [phone]

## 2025-05-07 ENCOUNTER — PATIENT OUTREACH (OUTPATIENT)
Dept: PRIMARY CARE | Facility: CLINIC | Age: 70
End: 2025-05-07
Payer: MEDICARE

## 2025-05-07 DIAGNOSIS — E11.9 TYPE 2 DIABETES MELLITUS WITHOUT COMPLICATION, WITHOUT LONG-TERM CURRENT USE OF INSULIN: ICD-10-CM

## 2025-05-07 DIAGNOSIS — N18.31 CHRONIC KIDNEY DISEASE, STAGE 3A (MULTI): ICD-10-CM

## 2025-05-07 RX ORDER — AZITHROMYCIN 250 MG/1
TABLET, FILM COATED ORAL
COMMUNITY
Start: 2025-05-07

## 2025-05-07 RX ORDER — TRAMADOL HYDROCHLORIDE 50 MG/1
TABLET ORAL
COMMUNITY
Start: 2025-05-07

## 2025-05-07 NOTE — PROGRESS NOTES
Call received from patient. He was at the dentist today and they did some type of procedure on him and gave him a zpak and tramadol. He is asking if this is ok to take with his kidney function being low. He was unable to ask the pharmacist who was swamped with patients when he picked the meds up. PCP or a covering PCP was unavailable at the time this call came through so I did message the [facility] pharmacist. My other concern was when I entered the medication into his med list, there was a warning that the Azithromycin and Fluconazole together can cause QT prolongation. Last EKG we have is from 2019. I asked if this was going to be ok to take. He does not have any history of QT prolongation or Afib. Per the pharmacist since we don't have an updated EKG, it is not possible to know for sure, but he did not think it would be a problem to take it especially since it is such a short term medication. As far as his kidneys go, there was no indication that the medication could not be given due to his current GFR of 41. Lucian did mention though that the daily Fluconazole dose was concerning with his GFR. This is not prescribed by us, so I would need to get this over to the prescribing physician to see if he possibly needs a dose reduction based on his current levels.     I did also mention to the patient that Dr. Siegel does want him to have an A1c done. His glucose was 304 on recent blood work and he is overdue for an A1c. We also discussed that he needs to increase his Losartan to 50 mg due to BP still being elevated. I will pend a new script to PCP. Patient asked if he could have 90 days instead of 30.

## 2025-05-09 RX ORDER — LOSARTAN POTASSIUM 50 MG/1
50 TABLET ORAL DAILY
Qty: 90 TABLET | Refills: 1 | Status: SHIPPED | OUTPATIENT
Start: 2025-05-09 | End: 2025-11-05

## 2025-05-21 ENCOUNTER — PATIENT OUTREACH (OUTPATIENT)
Dept: PRIMARY CARE | Facility: CLINIC | Age: 70
End: 2025-05-21
Payer: MEDICARE

## 2025-05-21 DIAGNOSIS — L97.312 NON-PRESSURE CHRONIC ULCER OF RIGHT ANKLE WITH FAT LAYER EXPOSED (MULTI): ICD-10-CM

## 2025-05-21 DIAGNOSIS — E11.9 TYPE 2 DIABETES MELLITUS WITHOUT COMPLICATION, WITHOUT LONG-TERM CURRENT USE OF INSULIN: ICD-10-CM

## 2025-05-21 NOTE — PROGRESS NOTES
Patient calls in to report that the Losartan 50 mg has made a big difference in his BP. Yesterday his BP was 128/84 and today it was 118/82. He is back to doing hyperbaric treatments M-F for the next 6 weeks for his leg wounds. So it may go back up again, but I will continue to watch the readings from the wound center and we can act accordingly depending on the readings.

## 2025-05-23 ENCOUNTER — PATIENT OUTREACH (OUTPATIENT)
Dept: PRIMARY CARE | Facility: CLINIC | Age: 70
End: 2025-05-23
Payer: MEDICARE

## 2025-05-23 DIAGNOSIS — N18.31 CHRONIC KIDNEY DISEASE, STAGE 3A (MULTI): ICD-10-CM

## 2025-05-23 DIAGNOSIS — E11.9 TYPE 2 DIABETES MELLITUS WITHOUT COMPLICATION, WITHOUT LONG-TERM CURRENT USE OF INSULIN: ICD-10-CM

## 2025-05-23 LAB
EST. AVERAGE GLUCOSE BLD GHB EST-MCNC: 269 MG/DL
EST. AVERAGE GLUCOSE BLD GHB EST-SCNC: 14.9 MMOL/L
HBA1C MFR BLD: 11 %

## 2025-05-23 NOTE — PROGRESS NOTES
Patient called and notified that his recent A1C cam back at 11.0. Patient states he has not had a sweet treat in a month. He is working on cutting back on carbs. He is on Prednisone 10 mg daily. He use to be on Jardiance, but it was stopped during his hospitalization December 2024. He is actively going for wound care for wounds on his right lower leg. He receive hyperbaric oxygen therapy. He states the wounds have been healing nicely. Dr. Siegel is concerned about his ability to heal with his diabetes not well controlled. She wants him to talk to our Pharmacist. Patient states he is willing to listen to what they have to say. Referral placed. He is aware he will get a call from scheduling to set up an appt. This is a phone call appt only.

## 2025-05-26 DIAGNOSIS — I10 PRIMARY HYPERTENSION: ICD-10-CM

## 2025-05-27 RX ORDER — METOPROLOL TARTRATE 25 MG/1
25 TABLET, FILM COATED ORAL 2 TIMES DAILY
Qty: 180 TABLET | Refills: 0 | Status: SHIPPED | OUTPATIENT
Start: 2025-05-27

## 2025-05-28 ENCOUNTER — PATIENT OUTREACH (OUTPATIENT)
Dept: PRIMARY CARE | Facility: CLINIC | Age: 70
End: 2025-05-28
Payer: MEDICARE

## 2025-05-28 DIAGNOSIS — E11.9 TYPE 2 DIABETES MELLITUS WITHOUT COMPLICATION, WITHOUT LONG-TERM CURRENT USE OF INSULIN: ICD-10-CM

## 2025-05-28 DIAGNOSIS — I10 PRIMARY HYPERTENSION: ICD-10-CM

## 2025-05-28 NOTE — PROGRESS NOTES
Call received from patient. He states that he does have an appt with the pharmacist for June 18th. He also mentions that since reducing his prednisone to 10 mg, he has been in much more pain. So it was decided that he increase this back up to 20 mg. He is aware this is going to negatively affect his BP and Glucose, but he has to do it. I did update his med list to reflect this change.

## 2025-06-18 ENCOUNTER — APPOINTMENT (OUTPATIENT)
Dept: PHARMACY | Facility: HOSPITAL | Age: 70
End: 2025-06-18
Payer: MEDICARE

## 2025-06-18 DIAGNOSIS — N18.31 CHRONIC KIDNEY DISEASE, STAGE 3A (MULTI): ICD-10-CM

## 2025-06-18 DIAGNOSIS — E11.9 TYPE 2 DIABETES MELLITUS WITHOUT COMPLICATION, WITHOUT LONG-TERM CURRENT USE OF INSULIN: ICD-10-CM

## 2025-06-18 RX ORDER — INSULIN GLARGINE 100 [IU]/ML
10 INJECTION, SOLUTION SUBCUTANEOUS NIGHTLY
Qty: 3 ML | Refills: 11 | Status: SHIPPED | OUTPATIENT
Start: 2025-06-18 | End: 2026-06-18

## 2025-06-18 NOTE — PROGRESS NOTES
Patient is sent at the request of Jeaneth Siegel DO for my opinion regarding Type 2 diabetes.  My final recommendations will be communicated back to the requesting provider by way of shared medical record.    Subjective     HPI    Past Medical History:  He has a past medical history of GBS (group B streptococcus) infection (12/30/2024), Non-healing wound of lower extremity (12/20/2024), Personal history of other specified conditions (06/06/2014), and Pyoderma gangrenosa (Multi) (01/07/2025).    Past Surgical History:  He has no past surgical history on file.    Social History:  He reports that he has been smoking cigarettes. He has never been exposed to tobacco smoke. He has never used smokeless tobacco. He reports current alcohol use. He reports that he does not use drugs.    Family History:  Family History[1]    Allergies:  Patient has no known allergies.    DIABETES MELLITUS TYPE 2:    Known diabetic complications: current leg wounds.  Does patient follow with Endocrinology: No  Patient reports sores or cuts on feet today     Currently taking prednisone 20 mg daily per Dr. Moeller with [facility] wound care    Currently enrolled in hyperbaric oxygen therapy     Current diabetic medications include:  N/A    Past diabetic medications include:  Janumet (stopped 12/2024 due to renal concerns)    Glucose Readings:  Glucometer/CGM Type: glucometer (Relion), Relion Premier test strips  Patient tests BG 2 times per day when beginning hyperbaric O2 Tx    Current home BG readings (mg/dL): Typically FBG ranges from 180-300  Previous home BG readings (mg/dL): n/a    Any episodes of hypoglycemia? No,  .    Did patient treat episode of hypoglycemia appropriately? N/A  Does the patient have a prescription for ready-to-use Glucagon? N/a    Primary Prevention:   Statin? Not currently taking atorvastatin  ACE-I/ARB? Yes  Aspirin? Yes    Pertinent PMH Review:  PMH of Pancreatitis: No  PMH of Retinopathy: No  PMH of Urinary Tract  "Infections: No  PMH of MTC: No  PMH of MEN2: No  UACR/EGFR in last year?: Yes    Immunizations:  Influenza? No  COVID? No  Pneumonia? No  Shingles? No  Hepatitis B? No    Drug Interactions:  None requiring intervention    Objective     Last Recorded Vitals:  BP Readings from Last 6 Encounters:   02/05/25 142/80   01/08/25 140/76   11/04/24 120/62   09/14/24 122/78   12/20/23 118/72   11/16/22 120/78        Wt Readings from Last 6 Encounters:   02/05/25 88.5 kg (195 lb)   01/08/25 85.5 kg (188 lb 6.4 oz)   11/04/24 91.1 kg (200 lb 12.8 oz)   09/14/24 94.8 kg (209 lb)   12/20/23 98 kg (216 lb)   11/16/22 99.3 kg (219 lb)       BMI Readings from Last 6 Encounters:   02/05/25 27.78 kg/m²   01/08/25 26.84 kg/m²   11/04/24 28.40 kg/m²   09/14/24 29.56 kg/m²   12/20/23 30.55 kg/m²   11/16/22 31.20 kg/m²       Lab Review  Lab Results   Component Value Date    BILITOT 0.4 03/10/2025    CALCIUM 9.5 03/10/2025    CO2 23 03/10/2025     03/10/2025    CREATININE 1.59 (H) 03/10/2025    GLUCOSE 170 (H) 03/10/2025    ALKPHOS 60 03/10/2025    K 5.3 03/10/2025    PROT 6.4 03/10/2025     03/10/2025    AST 20 03/10/2025    ALT 64 (H) 03/10/2025    BUN 51 (H) 03/10/2025    ANIONGAP 11 03/10/2025    MG 1.83 04/21/2022    ALBUMIN 4.5 03/10/2025    GFRMALE 54 (A) 04/21/2022     Lab Results   Component Value Date    TRIG 158 (H) 01/17/2025    CHOL 130 01/17/2025    LDLCALC 63 01/17/2025    HDL 35.3 01/17/2025     Lab Results   Component Value Date    HGBA1C 11.0 (H) 05/22/2025    HGBA1C 6.1 (H) 01/17/2025    HGBA1C 6.6 (A) 09/14/2024     No results found for: \"MICROALBCREA\"  The 10-year ASCVD risk score (Vince PUGA, et al., 2019) is: 43%    Values used to calculate the score:      Age: 70 years      Sex: Male      Is Non- : No      Diabetic: Yes      Tobacco smoker: Yes      Systolic Blood Pressure: 142 mmHg      Is BP treated: Yes      HDL Cholesterol: 35.3 mg/dL      Total Cholesterol: 130 " mg/dL      Assessment/Plan   Problem List Items Addressed This Visit       Diabetes mellitus (Multi)    Relevant Medications    insulin glargine (Lantus) 100 unit/mL (3 mL) pen    Other Relevant Orders    Referral to Clinical Pharmacy    Chronic kidney disease, stage 3a (Multi)    Relevant Medications    insulin glargine (Lantus) 100 unit/mL (3 mL) pen    Other Relevant Orders    Referral to Clinical Pharmacy       Patients diabetes is poorly controlled with most recent A1c of 11% (Goal < 7%). Complicated from excellent control by stopping Janumet due to renal concerns and starting prednisone 20 mg per [facility] wound care.   Initiate: Lantus 10 units at bedtime, may increase to 12 units if not controlled at next visit.   Will modify dosing as prednisone is adjusted  Patient to hold any doses if lows are observed or prednisone is adjusted, and contact RPh and PCP  Based on current wounds would avoid SGLT2i despite renal benefit  Would prefer to avoid noninsulin antihyperglycemics as multiple therapies may be required and side effects should be avoided.   Future recommendations for flu and PCV20 vaccinations  Compliance at present is estimated to be fair. Efforts to improve compliance (if necessary) will be directed at side effect and adherence counseling.  Education Provided to Patient: hypoglycemia, notify RPh and PCP if adjusting prednisone therapy   Follow-up: I recommend diabetes care be 1 month. 7/18/25 @ 1500  PCP Follow-Up: last visit 2/5/25, next to schedule    Girma Georges, [facility]      Continue all meds under the continuation of care with the referring provider and clinical pharmacy team.         [1]   Family History  Problem Relation Name Age of Onset    Alzheimer's disease Mother          Passed at age 88    Breast cancer Mother      No Known Problems Father      Obesity Sister      Heart disease Brother

## 2025-06-20 ENCOUNTER — PATIENT OUTREACH (OUTPATIENT)
Dept: PRIMARY CARE | Facility: CLINIC | Age: 70
End: 2025-06-20
Payer: MEDICARE

## 2025-06-20 DIAGNOSIS — I10 PRIMARY HYPERTENSION: ICD-10-CM

## 2025-06-20 DIAGNOSIS — E11.9 TYPE 2 DIABETES MELLITUS WITHOUT COMPLICATION, WITHOUT LONG-TERM CURRENT USE OF INSULIN: ICD-10-CM

## 2025-06-20 DIAGNOSIS — L97.312 NON-PRESSURE CHRONIC ULCER OF RIGHT ANKLE WITH FAT LAYER EXPOSED (MULTI): ICD-10-CM

## 2025-06-20 NOTE — PROGRESS NOTES
Care Management Monthly Outreach  Chart review completed  Confirmation of at least 2 patient identifiers  Change in insurance? No    Has patient been to ER/Urgent Care since last outreach? No    Last Office Visit with PCP: 2/5/2025   Next Office Visit with PCP: Visit date not found   [facility] Collaboration: Pharmacy    Chronic Conditions and Outreach Summary:   Type 2 diabetes mellitus without complication, without long-term current use of insulin    Primary hypertension    Non-pressure chronic ulcer of right ankle with fat layer exposed (Multi)    Call received from patient. We discussed his first meeting with [facility] pharmacist. They have started patient on 10 units of Lantus daily for his A1c of 11.0. Patient will be picking this up today. He has never given himself insulin or any injection. But he talked to someone at [facility] who have him instructions like to stay 2 inches away from his belly button. Keven feels like he will do ok with this. I advised that he call me if he has any questions or concerns. Also I would be happy to have him stop in the office for a tutorial in person if he feels he needs this. Patient is not thrilled to be on insulin now, but understands that the steroid is really driving up his numbers and once he is off of that, he likely will come off the insulin too. Also it will help him heal faster having his sugars better controlled. So for that reason, he is agreeable.     We also discussed his Blood Pressures. They have been doing well. Chart review shows readings over the last few weekdays have been: 128/86, 134/90, 142/88, 159/93, 175/77, 142/78, 161/85, 128/82. So he has had some really high readings still but also some really good levels too. Patient is very hopeful to come off of BP medication once he is done with his hyperbaric oxygen treatment.     Medications:   Are there medication changes since last visit? Yes -Insulin started-Lantus 10 units  Refills needed? No    Social Drivers of  Health: Deferred  Care Gaps Addressed? Deferred  Care Plan addressed: Yes    Upcoming Appointments:   Future Appointments       Date / Time Provider Department Dept Phone    7/18/2025 3:00 PM (Arrive by 2:45 PM) Girma Georges [facility]  Arrive at: [facility] [phone]          Blood Pressures Reviewed  BP Readings from Last 3 Encounters:   02/05/25 142/80   01/08/25 140/76   11/04/24 120/62     Labs Reviewed:  Lab Results   Component Value Date    CREATININE 1.59 (H) 03/10/2025    GLUCOSE 170 (H) 03/10/2025    ALKPHOS 60 03/10/2025    K 5.3 03/10/2025    PROT 6.4 03/10/2025     03/10/2025    CALCIUM 9.5 03/10/2025    AST 20 03/10/2025    ALT 64 (H) 03/10/2025    BUN 51 (H) 03/10/2025    MG 1.83 04/21/2022    GFRMALE 54 (A) 04/21/2022     Lab Results   Component Value Date    TRIG 158 (H) 01/17/2025    CHOL 130 01/17/2025    LDLCALC 63 01/17/2025    HDL 35.3 01/17/2025     Lab Results   Component Value Date    HGBA1C 11.0 (H) 05/22/2025    HGBA1C 6.1 (H) 01/17/2025    HGBA1C 6.6 (A) 09/14/2024     Lab Results   Component Value Date    WBC 11.7 (H) 01/17/2025    RBC 4.33 (L) 01/17/2025    HGB 13.8 01/17/2025     01/17/2025     No other concerns at this time.  Agreeable to continue monthly outreaches.  Encouraged to call if questions or concerns arise.    Luz Marina Zamora, RN   RN Care Manager   [facility]   [phone]

## 2025-07-03 ENCOUNTER — PATIENT OUTREACH (OUTPATIENT)
Dept: PRIMARY CARE | Facility: CLINIC | Age: 70
End: 2025-07-03
Payer: MEDICARE

## 2025-07-03 DIAGNOSIS — E11.9 TYPE 2 DIABETES MELLITUS WITHOUT COMPLICATION, WITHOUT LONG-TERM CURRENT USE OF INSULIN: ICD-10-CM

## 2025-07-03 DIAGNOSIS — L97.312 NON-PRESSURE CHRONIC ULCER OF RIGHT ANKLE WITH FAT LAYER EXPOSED (MULTI): ICD-10-CM

## 2025-07-03 DIAGNOSIS — I10 PRIMARY HYPERTENSION: ICD-10-CM

## 2025-07-03 NOTE — PROGRESS NOTES
Care Management Monthly Outreach  Chart review completed  Confirmation of at least 2 patient identifiers  Change in insurance? No    Has patient been to ER/Urgent Care since last outreach? 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 He got a copy of labs in the mail that he had performed back in May. It just had his sugar level circled. I explained that we had already discussed this and started him on insulin, so he can disregard this. Keven states his sugars haven't really improved on the 10 units of Lantus. He is still getting readings in the mid 200's. We reviewed administration technique and it sounds like he is doing it correctly. He is on a 2 week break from hyperbaric so he iwll have to check it at home, but maybe the break will bring his numbers down a little. Also I found out he is dealing with a bad infection in his main leg wound. He is on antibiotics for this. Infection could also be driving his numbers up. He is being very conscious of his diet. He does eat a lot of fruit and protein. But he does not consume a lot of sugary foods. He has a follow up with Girma [facility] Pharmacist on 7/18 and I am thinking his insulin will need to be increased.     We discussed his BP readings. He has been doing very well on his Losartan 50 mg. His readings have been in the 120's/70 range. He even had a 115/76 and a 105/72 reading earlier this week. He spiked up one day to 152/83 but he is mostly within range. So he was happy about this, as he should be. He has made good progress with his lowering his BP.     Regarding his wounds, as mentioned about he has an infection currently. But the  wound that was on the back of his leg that was exposing the achilles tendon has basically healed up. He just has the bad one in the front still.     Medications:   Are there medication changes since last visit? No  Refills needed? No    Social Drivers of Health: Deferred  Care Gaps Addressed? Deferred  Care Plan addressed: Yes    Upcoming Appointments:   Future Appointments       Date / Time Provider Department Dept Phone    7/18/2025 3:00 PM (Arrive by 2:45 PM) Girma Georges [facility]  Arrive at: [facility] [phone]          Blood Pressures Reviewed  BP Readings from Last 3 Encounters:   02/05/25 142/80   01/08/25 140/76   11/04/24 120/62     Labs Reviewed:  Lab Results   Component Value Date    CREATININE 1.59 (H) 03/10/2025    GLUCOSE 170 (H) 03/10/2025    ALKPHOS 60 03/10/2025    K 5.3 03/10/2025    PROT 6.4 03/10/2025     03/10/2025    CALCIUM 9.5 03/10/2025    AST 20 03/10/2025    ALT 64 (H) 03/10/2025    BUN 51 (H) 03/10/2025    MG 1.83 04/21/2022    GFRMALE 54 (A) 04/21/2022     Lab Results   Component Value Date    TRIG 158 (H) 01/17/2025    CHOL 130 01/17/2025    LDLCALC 63 01/17/2025    HDL 35.3 01/17/2025     Lab Results   Component Value Date    HGBA1C 11.0 (H) 05/22/2025    HGBA1C 6.1 (H) 01/17/2025    HGBA1C 6.6 (A) 09/14/2024     Lab Results   Component Value Date    WBC 11.7 (H) 01/17/2025    RBC 4.33 (L) 01/17/2025    HGB 13.8 01/17/2025     01/17/2025   No other concerns at this time.  Agreeable to continue monthly outreaches.  Encouraged to call if questions or concerns arise.    Luz Marina Zamora RN   RN Care Manager   [facility]   [phone]

## 2025-07-18 ENCOUNTER — APPOINTMENT (OUTPATIENT)
Dept: PHARMACY | Facility: HOSPITAL | Age: 70
End: 2025-07-18
Payer: MEDICARE

## 2025-07-21 ENCOUNTER — PATIENT OUTREACH (OUTPATIENT)
Dept: PRIMARY CARE | Facility: CLINIC | Age: 70
End: 2025-07-21
Payer: MEDICARE

## 2025-07-21 NOTE — PROGRESS NOTES
Chart review completed. Call placed to patient for transitional care management. No answer. Left message to call back. Admission: 7/11 to 7/17. No upcoming PCP appt noted.

## 2025-07-22 ENCOUNTER — PATIENT OUTREACH (OUTPATIENT)
Dept: PRIMARY CARE | Facility: CLINIC | Age: 70
End: 2025-07-22
Payer: MEDICARE

## 2025-07-22 DIAGNOSIS — I10 PRIMARY HYPERTENSION: ICD-10-CM

## 2025-07-22 DIAGNOSIS — L97.312 NON-PRESSURE CHRONIC ULCER OF RIGHT ANKLE WITH FAT LAYER EXPOSED (MULTI): ICD-10-CM

## 2025-07-22 DIAGNOSIS — E11.9 TYPE 2 DIABETES MELLITUS WITHOUT COMPLICATION, WITHOUT LONG-TERM CURRENT USE OF INSULIN: ICD-10-CM

## 2025-07-22 NOTE — PROGRESS NOTES
Call back received from patient. We discussed parts of his hospitalization like what the plan was regarding his hyperglycemia. He states that he saw Endo in the hospital and they tried different ways to get his sugar down, but he was all over the place. Eventually they said they just couldn't control this well while he was actively fighting this infection. They sent him home on his original 10 units of Lantus. And this was even with him being on a carb controlled diet in the hospital. So he continues with his Lantus and with glucose monitoring. He states the readings are about the same, up and down. He is currently trying to wean himself down on the prednisone. He was doing 20 mg daily and this past week he has been alternating 20 mg and 15 mg. Then next week he will try 15 mg daily. Then maybe try alternating 15 mg one day and 10 mg the next. It depends on the pain in his leg. Right now he feels good. He has discussed the weaning process with his podiatrist who prescribes the prednisone and they are in agreement with the plan.     He is not doing hyperbaric oxygen therapy right now. He states he is on the list, but it is full. He goes once a week for wound treatment. He goes daily for IV Vancomycin infusions. He is half way done with this. His treatment would be a total of 4 weeks.     He denies any further needs at this time.

## 2025-08-04 ENCOUNTER — TELEPHONE (OUTPATIENT)
Dept: PRIMARY CARE | Facility: CLINIC | Age: 70
End: 2025-08-04

## 2025-08-04 ENCOUNTER — PATIENT OUTREACH (OUTPATIENT)
Dept: PRIMARY CARE | Facility: CLINIC | Age: 70
End: 2025-08-04
Payer: MEDICARE

## 2025-08-04 DIAGNOSIS — L97.312 NON-PRESSURE CHRONIC ULCER OF RIGHT ANKLE WITH FAT LAYER EXPOSED (MULTI): ICD-10-CM

## 2025-08-04 DIAGNOSIS — E11.9 TYPE 2 DIABETES MELLITUS WITHOUT COMPLICATION, WITHOUT LONG-TERM CURRENT USE OF INSULIN: ICD-10-CM

## 2025-08-04 DIAGNOSIS — F17.200 CURRENT EVERY DAY SMOKER: ICD-10-CM

## 2025-08-04 DIAGNOSIS — L88 PYODERMA GANGRENOSA (MULTI): ICD-10-CM

## 2025-08-04 DIAGNOSIS — I10 PRIMARY HYPERTENSION: ICD-10-CM

## 2025-08-04 RX ORDER — MICONAZOLE NITRATE 2 %
CREAM (GRAM) TOPICAL
Qty: 300 EACH | Refills: 2 | Status: SHIPPED | OUTPATIENT
Start: 2025-08-04

## 2025-08-04 NOTE — PROGRESS NOTES
Patient requesting medication to help him stop smoking. He states he smokes about 1/2 pack per day. He would like to try the gum if possible. [place] #1. He would like to see if his insurance will cover any of these items.

## 2025-08-04 NOTE — PROGRESS NOTES
Care Management Monthly Outreach  Chart review completed  Confirmation of at least 2 patient identifiers  Change in insurance? No    Has patient been to ER/Urgent Care since last outreach? No    Last Office Visit with PCP: 2/5/2025   Next Office Visit with PCP: Visit date not found   [facility] Collaboration: n/a    Chronic Conditions and Outreach Summary:   Type 2 diabetes mellitus without complication, without long-term current use of insulin    Primary hypertension    Non-pressure chronic ulcer of right ankle with fat layer exposed (Multi)    Pyoderma gangrenosa (Multi)    Call received from patient. He continues with his IV antibiotic for the rest of this week. He meets with the wound provider as well and believes his tunneled line will come out at the end of this week as well. He states his wound is feeling good. He denies any pain. He is able to walk normal. He still has some redness to the area though. He continues on Prednisone despite wanting to come off it due to it increasing his sugars. He remains compliant with his medications. He was told though that he cannot come off Prednisone as it is the only medication they have to treat his Pyoderma gangrenosa.     He did request a script for something to help him stop smoking. He states he is smoking about 1/2 pack per day. He did stop for the 7 days he was hospitalized but states it is more the habit that gets him back to smoking again. We discussed gum vs lozenge vs patch and he would like to try the gum. Message sent to PCP to let me know what the right dose would be for the script and I will pend it.     Medications:   Are there medication changes since last visit? Yes - Prednisone down to 15 mg daily.   Refills needed? No    Social Drivers of Health: Addressed today  Care Gaps Addressed? Deferred  Care Plan addressed: Yes    Upcoming Appointments:     Blood Pressures Reviewed  BP Readings from Last 3 Encounters:   02/05/25 142/80   01/08/25 140/76   11/04/24  120/62     Labs Reviewed:  Lab Results   Component Value Date    CREATININE 1.59 (H) 03/10/2025    GLUCOSE 170 (H) 03/10/2025    ALKPHOS 60 03/10/2025    K 5.3 03/10/2025    PROT 6.4 03/10/2025     03/10/2025    CALCIUM 9.5 03/10/2025    AST 20 03/10/2025    ALT 64 (H) 03/10/2025    BUN 51 (H) 03/10/2025    MG 1.83 04/21/2022    GFRMALE 54 (A) 04/21/2022     Lab Results   Component Value Date    TRIG 158 (H) 01/17/2025    CHOL 130 01/17/2025    LDLCALC 63 01/17/2025    HDL 35.3 01/17/2025     Lab Results   Component Value Date    HGBA1C 11.0 (H) 05/22/2025    HGBA1C 6.1 (H) 01/17/2025    HGBA1C 6.6 (A) 09/14/2024     Lab Results   Component Value Date    WBC 11.7 (H) 01/17/2025    RBC 4.33 (L) 01/17/2025    HGB 13.8 01/17/2025     01/17/2025   No other concerns at this time.  Agreeable to continue monthly outreaches.  Encouraged to call if questions or concerns arise.    Luz Marina Zamora, RN   RN Care Manager   [facility]   [phone]

## 2025-08-20 ENCOUNTER — PATIENT OUTREACH (OUTPATIENT)
Dept: PRIMARY CARE | Facility: CLINIC | Age: 70
End: 2025-08-20
Payer: MEDICARE

## 2025-08-20 DIAGNOSIS — E11.9 TYPE 2 DIABETES MELLITUS WITHOUT COMPLICATION, WITHOUT LONG-TERM CURRENT USE OF INSULIN: ICD-10-CM

## 2025-08-20 DIAGNOSIS — L97.312 NON-PRESSURE CHRONIC ULCER OF RIGHT ANKLE WITH FAT LAYER EXPOSED (MULTI): ICD-10-CM

## 2025-08-20 DIAGNOSIS — F17.200 CURRENT EVERY DAY SMOKER: ICD-10-CM

## 2025-08-29 ENCOUNTER — PATIENT OUTREACH (OUTPATIENT)
Dept: PRIMARY CARE | Facility: CLINIC | Age: 70
End: 2025-08-29
Payer: MEDICARE

## 2025-08-29 DIAGNOSIS — L97.312 NON-PRESSURE CHRONIC ULCER OF RIGHT ANKLE WITH FAT LAYER EXPOSED (MULTI): ICD-10-CM

## 2025-08-29 DIAGNOSIS — N18.31 CHRONIC KIDNEY DISEASE, STAGE 3A (MULTI): ICD-10-CM

## 2025-08-29 DIAGNOSIS — E11.9 TYPE 2 DIABETES MELLITUS WITHOUT COMPLICATION, WITHOUT LONG-TERM CURRENT USE OF INSULIN: ICD-10-CM
